# Patient Record
Sex: FEMALE | Race: WHITE | Employment: UNEMPLOYED | RURAL
[De-identification: names, ages, dates, MRNs, and addresses within clinical notes are randomized per-mention and may not be internally consistent; named-entity substitution may affect disease eponyms.]

---

## 2017-03-13 ENCOUNTER — OFFICE VISIT (OUTPATIENT)
Dept: FAMILY MEDICINE CLINIC | Age: 37
End: 2017-03-13

## 2017-03-13 VITALS
RESPIRATION RATE: 12 BRPM | OXYGEN SATURATION: 98 % | DIASTOLIC BLOOD PRESSURE: 27 MMHG | HEIGHT: 62 IN | SYSTOLIC BLOOD PRESSURE: 87 MMHG | BODY MASS INDEX: 18.95 KG/M2 | HEART RATE: 73 BPM | WEIGHT: 103 LBS | TEMPERATURE: 98.2 F

## 2017-03-13 DIAGNOSIS — G89.18 PAIN FOLLOWING ORAL SURGERY: Primary | ICD-10-CM

## 2017-03-13 RX ORDER — HYDROCODONE BITARTRATE AND ACETAMINOPHEN 5; 325 MG/1; MG/1
TABLET ORAL
COMMUNITY
End: 2017-08-11

## 2017-03-13 RX ORDER — HYDROCODONE BITARTRATE AND ACETAMINOPHEN 5; 325 MG/1; MG/1
1 TABLET ORAL
Qty: 30 TAB | Refills: 0 | Status: SHIPPED | OUTPATIENT
Start: 2017-03-13 | End: 2017-08-11

## 2017-03-13 RX ORDER — LIDOCAINE HYDROCHLORIDE 20 MG/ML
5 SOLUTION OROPHARYNGEAL AS NEEDED
Qty: 100 ML | Refills: 2 | Status: SHIPPED | OUTPATIENT
Start: 2017-03-13 | End: 2017-08-11

## 2017-03-13 NOTE — PATIENT INSTRUCTIONS

## 2017-03-13 NOTE — MR AVS SNAPSHOT
Visit Information Date & Time Provider Department Dept. Phone Encounter #  
 3/13/2017  3:20 PM Casie Ross MD [address] [place] [address] [phone] [id number] Upcoming Health Maintenance Date Due Pneumococcal 19-64 Medium Risk (1 of 1 - PPSV23) 10/13/2000 DTaP/Tdap/Td series (1 - Tdap) 10/13/2002 INFLUENZA AGE 9 TO ADULT 8/1/2016 PAP AKA CERVICAL CYTOLOGY 3/19/2017 Allergies as of 3/13/2017  Review Complete On: 3/13/2017 By: Sridevi Remedies Severity Noted Reaction Type Reactions Aspirin  07/08/2014   Systemic Hives, Unknown (comments)  
 headaches Current Immunizations  Never Reviewed No immunizations on file. Not reviewed this visit You Were Diagnosed With   
  
 Codes Comments Pain following oral surgery    -  Primary ICD-10-CM: G89.18 
ICD-9-CM: 528.9, 338.18 Vitals BP Pulse Temp Resp Height(growth percentile) Weight(growth percentile) (!) 87/27 73 98.2 °F (36.8 °C) (Oral) 12 5' 2\" (1.575 m) 103 lb (46.7 kg) LMP SpO2 BMI OB Status Smoking Status 03/04/2017 98% 18.84 kg/m2 Having regular periods Current Every Day Smoker Vitals History BMI and BSA Data Body Mass Index Body Surface Area  
 18.84 kg/m 2 1.43 m 2 Preferred Pharmacy Pharmacy Name Phone [facility] [phone] Your Updated Medication List  
  
   
This list is accurate as of: 3/13/17  3:47 PM.  Always use your most recent med list.  
  
  
  
  
 acetaminophen 500 mg tablet Commonly known as:  TYLENOL Take 2 Tabs by mouth three (3) times daily as needed for Pain. Alternate with motrin. cyclobenzaprine 10 mg tablet Commonly known as:  FLEXERIL Take 1 Tab by mouth three (3) times daily as needed. * HYDROcodone-acetaminophen 5-325 mg per tablet Commonly known as:  Kamala Christopher Take  by mouth. * HYDROcodone-acetaminophen 5-325 mg per tablet Commonly known as:  Bibiana Arts Take 1 Tab by mouth every eight (8) hours as needed for Pain. Max Daily Amount: 3 Tabs.  
  
 lidocaine 2 % solution Commonly known as:  XYLOCAINE Take 5 mL by mouth as needed. For oral pain. * Notice: This list has 2 medication(s) that are the same as other medications prescribed for you. Read the directions carefully, and ask your doctor or other care provider to review them with you. Prescriptions Printed Refills  
 lidocaine (XYLOCAINE) 2 % solution 2 Sig: Take 5 mL by mouth as needed. For oral pain. Class: Print Route: Oral  
 HYDROcodone-acetaminophen (NORCO) 5-325 mg per tablet 0 Sig: Take 1 Tab by mouth every eight (8) hours as needed for Pain. Max Daily Amount: 3 Tabs. Class: Print Route: Oral  
  
Patient Instructions A Healthy Lifestyle: Care Instructions Your Care Instructions A healthy lifestyle can help you feel good, stay at a healthy weight, and have plenty of energy for both work and play. A healthy lifestyle is something you can share with your whole family. A healthy lifestyle also can lower your risk for serious health problems, such as high blood pressure, heart disease, and diabetes. You can follow a few steps listed below to improve your health and the health of your family. Follow-up care is a key part of your treatment and safety. Be sure to make and go to all appointments, and call your doctor if you are having problems. Its also a good idea to know your test results and keep a list of the medicines you take. How can you care for yourself at home? · Do not eat too much sugar, fat, or fast foods. You can still have dessert and treats now and then. The goal is moderation. · Start small to improve your eating habits. Pay attention to portion sizes, drink less juice and soda pop, and eat more fruits and vegetables. ¨ Eat a healthy amount of food. A 3-ounce serving of meat, for example, is about the size of a deck of cards. Fill the rest of your plate with vegetables and whole grains. ¨ Limit the amount of soda and sports drinks you have every day. Drink more water when you are thirsty. ¨ Eat at least 5 servings of fruits and vegetables every day. It may seem like a lot, but it is not hard to reach this goal. A serving or helping is 1 piece of fruit, 1 cup of vegetables, or 2 cups of leafy, raw vegetables. Have an apple or some carrot sticks as an afternoon snack instead of a candy bar. Try to have fruits and/or vegetables at every meal. 
· Make exercise part of your daily routine. You may want to start with simple activities, such as walking, bicycling, or slow swimming. Try to be active 30 to 60 minutes every day. You do not need to do all 30 to 60 minutes all at once. For example, you can exercise 3 times a day for 10 or 20 minutes. Moderate exercise is safe for most people, but it is always a good idea to talk to your doctor before starting an exercise program. 
· Keep moving. [place] Feeling the lawn, work in the garden, or [employer]. Take the stairs instead of the elevator at work. · If you smoke, quit. People who smoke have an increased risk for heart attack, stroke, cancer, and other lung illnesses. Quitting is hard, but there are ways to boost your chance of quitting tobacco for good. ¨ Use nicotine gum, patches, or lozenges. ¨ Ask your doctor about stop-smoking programs and medicines. ¨ Keep trying. In addition to reducing your risk of diseases in the future, you will notice some benefits soon after you stop using tobacco. If you have shortness of breath or asthma symptoms, they will likely get better within a few weeks after you quit. · Limit how much alcohol you drink. Moderate amounts of alcohol (up to 2 drinks a day for men, 1 drink a day for women) are okay.  But drinking too much can lead to liver problems, high blood pressure, and other health problems. Family health If you have a family, there are many things you can do together to improve your health. · Eat meals together as a family as often as possible. · Eat healthy foods. This includes fruits, vegetables, lean meats and dairy, and whole grains. · Include your family in your fitness plan. Most people think of activities such as jogging or tennis as the way to fitness, but there are many ways you and your family can be more active. Anything that makes you breathe hard and gets your heart pumping is exercise. Here are some tips: 
¨ Walk to do errands or to take your child to school or the bus. ¨ Go for a family bike ride after dinner instead of watching TV. Where can you learn more? Go to http://stanley-bertin.info/. Enter K364 in the search box to learn more about \"A Healthy Lifestyle: Care Instructions. \" Current as of: July 26, 2016 Content Version: 11.1 © 5404-3420 [employer]. Care instructions adapted under license by [employer] (which disclaims liability or warranty for this information). If you have questions about a medical condition or this instruction, always ask your healthcare professional. [place] [address] any warranty or liability for your use of this information. Introducing [address]! Delfina Campbell introduces [employer] patient portal. Now you can access parts of your medical record, email your doctor's office, and request medication refills online. 1. In your internet browser, go to [email or web address] 2. Click on the First Time User? Click Here link in the Sign In box. You will see the New Member Sign Up page. 3. Enter your [employer] Access Code exactly as it appears below. You will not need to use this code after youve completed the sign-up process.  If you do not sign up before the expiration date, you must request a new code. · [employer] Access Code: [email or web address] Expires: 6/11/2017  3:02 PM 
 
4. Enter the last four digits of your Social Security Number (xxxx) and Date of Birth (mm/dd/yyyy) as indicated and click Submit. You will be taken to the next sign-up page. 5. Create a [employer] ID. This will be your [employer] login ID and cannot be changed, so think of one that is secure and easy to remember. 6. Create a [employer] password. You can change your password at any time. 7. Enter your Password Reset Question and Answer. This can be used at a later time if you forget your password. 8. Enter your e-mail address. You will receive e-mail notification when new information is available in [address] 9. Click Sign Up. You can now view and download portions of your medical record. 10. Click the Download Summary menu link to download a portable copy of your medical information. If you have questions, please visit the Frequently Asked Questions section of the [employer] website. Remember, [employer] is NOT to be used for urgent needs. For medical emergencies, dial 911. Now available from your iPhone and Android! Please provide this summary of care documentation to your next provider. Your primary care clinician is listed as Thomas Archuleta. If you have any questions after today's visit, please call [phone].

## 2017-03-13 NOTE — PROGRESS NOTES
Reviewed record in preparation for visit and have necessary documentation      Body mass index is 18.84 kg/(m^2).     Health Maintenance Due   Topic Date Due    Pneumococcal 19-64 Medium Risk (1 of 1 - PPSV23) 10/13/2000    DTaP/Tdap/Td series (1 - Tdap) 10/13/2002    INFLUENZA AGE 9 TO ADULT  08/01/2016    PAP AKA CERVICAL CYTOLOGY  03/19/2017

## 2017-03-14 ENCOUNTER — TELEPHONE (OUTPATIENT)
Dept: FAMILY MEDICINE CLINIC | Age: 37
End: 2017-03-14

## 2017-03-14 NOTE — TELEPHONE ENCOUNTER
Please call the patient in reference to her appiontment yesterday. She can be reached at [phone].

## 2017-03-14 NOTE — TELEPHONE ENCOUNTER
Patient called back. Informed her of the below. She states that she can't decrease the frequency of her pain medication because she just had some teeth removed. She stated that she would just deal with the dizziness.

## 2017-03-14 NOTE — TELEPHONE ENCOUNTER
Elo Pugh from [facility] called. They need to know how many times a day the patient is to use the Lidocaine. Please advise at [phone].

## 2017-03-20 NOTE — PROGRESS NOTES
Patient: Melanie To MRN: [id number]  SSN: [id number]    YOB: 1980  Age: 29 y.o. Sex: female        Subjective:     Chief Complaint   Patient presents with    Dental Pain       HPI: she is a 29y.o. year old female who presents for     Encounter Diagnoses   Name Primary?  Pain following oral surgery Yes       BP Readings from Last 3 Encounters:   03/13/17 (!) 87/27   01/20/16 97/61   11/13/15 (!) 89/64       Wt Readings from Last 3 Encounters:   03/13/17 103 lb (46.7 kg)   01/20/16 108 lb (49 kg)   11/13/15 106 lb 3.2 oz (48.2 kg)     Body mass index is 18.84 kg/(m^2). Current and past medical information:    Current Medications after this visit[de-identified]     Current Outpatient Prescriptions   Medication Sig    HYDROcodone-acetaminophen (NORCO) 5-325 mg per tablet Take  by mouth.  lidocaine (XYLOCAINE) 2 % solution Take 5 mL by mouth as needed. For oral pain.  HYDROcodone-acetaminophen (NORCO) 5-325 mg per tablet Take 1 Tab by mouth every eight (8) hours as needed for Pain. Max Daily Amount: 3 Tabs.  acetaminophen (TYLENOL) 500 mg tablet Take 2 Tabs by mouth three (3) times daily as needed for Pain. Alternate with motrin.  cyclobenzaprine (FLEXERIL) 10 mg tablet Take 1 Tab by mouth three (3) times daily as needed. No current facility-administered medications for this visit.         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    Depression with anxiety 07/11/2014    Anemia 07/11/2014    Tobacco abuse 07/11/2014       Past Medical History:   Diagnosis Date    ADD (attention deficit disorder)     Anemia     Anxiety     Binge-eating and purging type anorexia nervosa     CMT (Charcot-Anabella-Tooth disease)     Depression        Allergies   Allergen Reactions    Aspirin Hives and Unknown (comments)     headaches       Past Surgical History:   Procedure Laterality Date    BREAST SURGERY PROCEDURE UNLISTED      HX GYN         Social History     Social History    Marital status:      Spouse name: N/A    Number of children: N/A    Years of education: N/A     Social History Main Topics    Smoking status: Current Every Day Smoker    Smokeless tobacco: Never Used    Alcohol use Yes      Comment: once in a while    Drug use: No    Sexual activity: Yes     Partners: Male     Other Topics Concern    None     Social History Narrative         Objective:     Review of Systems:  Constitutional: Negative for F/C, fatigue or malaise  Derm: Negative for rash or lesion  HEENT: see HPI  Cardiovascular: Negative for dizziness, chest pain or palpitations  Respiratory: Negative for cough, wheezing or SOB  Neurological: Negative for headache, weakness or paresthesia    Vitals:    03/13/17 1522   BP: (!) 87/27   Pulse: 73   Resp: 12   Temp: 98.2 °F (36.8 °C)   TempSrc: Oral   SpO2: 98%   Weight: 103 lb (46.7 kg)   Height: 5' 2\" (1.575 m)      Body mass index is 18.84 kg/(m^2). Physical Exam:  Constitutional: thin, appearas uncomfortable  Head: normocephalic, atraumatic  Eyes: sclera clear, EOMI  Oropharynx: edentulous, gingival edema    Neck: normal range of motion  Cardiovascular:  regular rate and rhythm  Respiratory: clear with symmetrical effort  Extremities: full range of motion  Neurology: normal strength and sensation  Psych: active, alert and oriented, affect appropriate       Health Maintenance Due   Topic Date Due    Pneumococcal 19-64 Medium Risk (1 of 1 - PPSV23) 10/13/2000    DTaP/Tdap/Td series (1 - Tdap) 10/13/2002    INFLUENZA AGE 9 TO ADULT  08/01/2016    PAP AKA CERVICAL CYTOLOGY  03/19/2017       Risk, benefits and potential costs of recommended health maintenance discussed. Patient expressed understanding and declined at this time. Assessment and orders:       ICD-10-CM ICD-9-CM    1.  Pain following oral surgery G89.18 528.9      338.18          Plan of care:  Diagnoses were discussed in detail with patient. Medication risks/benefits/side effects discussed with patient. All of the patient's questions were addressed and answered to apparent satisfaction. The patient understands and agrees with our plan of care. The patient knows to call back if they have questions about the plan of care or if symptoms change. The patient received an After-Visit Summary which contains VS, diagnoses, orders, allergy and medication lists. Patient Care Team:  Lien Childers MD as PCP - [facility])    Follow-up Disposition:  Return if symptoms worsen or fail to improve. No future appointments.     Signed By: iLen Childers MD     March 19, 2017

## 2017-08-11 ENCOUNTER — OFFICE VISIT (OUTPATIENT)
Dept: FAMILY MEDICINE CLINIC | Age: 37
End: 2017-08-11

## 2017-08-11 VITALS
SYSTOLIC BLOOD PRESSURE: 92 MMHG | TEMPERATURE: 98.1 F | RESPIRATION RATE: 20 BRPM | DIASTOLIC BLOOD PRESSURE: 61 MMHG | HEIGHT: 62 IN | BODY MASS INDEX: 18.99 KG/M2 | HEART RATE: 74 BPM | WEIGHT: 103.2 LBS | OXYGEN SATURATION: 96 %

## 2017-08-11 DIAGNOSIS — R51.9 NONINTRACTABLE HEADACHE, UNSPECIFIED CHRONICITY PATTERN, UNSPECIFIED HEADACHE TYPE: ICD-10-CM

## 2017-08-11 DIAGNOSIS — Z72.0 TOBACCO ABUSE: ICD-10-CM

## 2017-08-11 DIAGNOSIS — F32.A DEPRESSION, UNSPECIFIED DEPRESSION TYPE: ICD-10-CM

## 2017-08-11 DIAGNOSIS — J06.0 ACUTE LARYNGOPHARYNGITIS: Primary | ICD-10-CM

## 2017-08-11 LAB
S PYO AG THROAT QL: POSITIVE
VALID INTERNAL CONTROL?: YES

## 2017-08-11 RX ORDER — FLUOXETINE 10 MG/1
10 CAPSULE ORAL DAILY
Qty: 30 CAP | Refills: 5 | Status: SHIPPED | OUTPATIENT
Start: 2017-08-11 | End: 2018-08-20

## 2017-08-11 RX ORDER — DICLOFENAC SODIUM 75 MG/1
75 TABLET, DELAYED RELEASE ORAL
Qty: 60 TAB | Refills: 0 | Status: SHIPPED | OUTPATIENT
Start: 2017-08-11 | End: 2017-10-25

## 2017-08-11 RX ORDER — ALPRAZOLAM 0.5 MG/1
TABLET ORAL
COMMUNITY
End: 2018-08-20 | Stop reason: SDUPTHER

## 2017-08-11 RX ORDER — AMOXICILLIN 500 MG/1
500 CAPSULE ORAL 3 TIMES DAILY
Qty: 30 CAP | Refills: 0 | Status: SHIPPED | OUTPATIENT
Start: 2017-08-11 | End: 2017-08-21

## 2017-08-11 NOTE — PROGRESS NOTES
Health Maintenance Due   Topic Date Due    Pneumococcal 19-64 Medium Risk (1 of 1 - PPSV23) 10/13/2000    DTaP/Tdap/Td series (1 - Tdap) 10/13/2002    PAP AKA CERVICAL CYTOLOGY  03/19/2017    INFLUENZA AGE 9 TO ADULT  08/01/2017

## 2017-08-11 NOTE — MR AVS SNAPSHOT
Visit Information Date & Time Provider Department Dept. Phone Encounter #  
 8/11/2017 10:20 AM Trista Graham MD [facility] [id number] Upcoming Health Maintenance Date Due Pneumococcal 19-64 Medium Risk (1 of 1 - PPSV23) 10/13/2000 DTaP/Tdap/Td series (1 - Tdap) 10/13/2002 PAP AKA CERVICAL CYTOLOGY 3/19/2017 INFLUENZA AGE 9 TO ADULT 8/1/2017 Allergies as of 8/11/2017  Review Complete On: 8/11/2017 By: Trista Graham MD  
  
 Severity Noted Reaction Type Reactions Aspirin  07/08/2014   Systemic Hives, Unknown (comments)  
 headaches Current Immunizations  Never Reviewed No immunizations on file. Not reviewed this visit You Were Diagnosed With   
  
 Codes Comments Acute laryngopharyngitis    -  Primary ICD-10-CM: J06.0 ICD-9-CM: 465.0 Depression, unspecified depression type     ICD-10-CM: F32.9 ICD-9-CM: 429 Nonintractable headache, unspecified chronicity pattern, unspecified headache type     ICD-10-CM: R51 ICD-9-CM: 784.0 Tobacco abuse     ICD-10-CM: Z72.0 ICD-9-CM: 305.1 Vitals BP Pulse Temp Resp Height(growth percentile) Weight(growth percentile) 92/61 74 98.1 °F (36.7 °C) (Oral) 20 5' 2\" (1.575 m) 103 lb 3.2 oz (46.8 kg) LMP SpO2 BMI OB Status Smoking Status 07/30/2017 96% 18.88 kg/m2 Having regular periods Current Every Day Smoker Vitals History BMI and BSA Data Body Mass Index Body Surface Area  
 18.88 kg/m 2 1.43 m 2 Preferred Pharmacy Pharmacy Name Phone [address] [facility] [place] Your Updated Medication List  
  
   
This list is accurate as of: 8/11/17 11:18 AM.  Always use your most recent med list.  
  
  
  
  
 acetaminophen 500 mg tablet Commonly known as:  TYLENOL Take 2 Tabs by mouth three (3) times daily as needed for Pain. Alternate with motrin. amoxicillin 500 mg capsule Commonly known as:  AMOXIL Take 1 Cap by mouth three (3) times daily for 10 days. cyclobenzaprine 10 mg tablet Commonly known as:  FLEXERIL Take 1 Tab by mouth three (3) times daily as needed. diclofenac EC 75 mg EC tablet Commonly known as:  VOLTAREN Take 1 Tab by mouth two (2) times daily as needed. For Headache. FLUoxetine 10 mg capsule Commonly known as:  PROzac Take 1 Cap by mouth daily. XANAX 0.5 mg tablet Generic drug:  ALPRAZolam  
Take  by mouth. Prescriptions Sent to Pharmacy Refills FLUoxetine (PROZAC) 10 mg capsule 5 Sig: Take 1 Cap by mouth daily. Class: Normal  
 Pharmacy: [facility] [address] Ph #: [phone] Route: Oral  
 diclofenac EC (VOLTAREN) 75 mg EC tablet 0 Sig: Take 1 Tab by mouth two (2) times daily as needed. For Headache. Class: Normal  
 [facility] [address] [facility] #: [phone] Route: Oral  
 amoxicillin (AMOXIL) 500 mg capsule 0 Sig: Take 1 Cap by mouth three (3) times daily for 10 days. Class: Normal  
 [facility] [address] Ph #: [phone] Route: Oral  
  
We Performed the Following AMB POC RAPID STREP A [93099 CPT(R)] Patient Instructions Recovering From Depression: Care Instructions Your Care Instructions Taking good care of yourself is important as you recover from depression. In time, your symptoms will fade as your treatment takes hold. Do not give up. Instead, focus your energy on getting better. Your mood will improve. It just takes some time. Focus on things that can help you feel better, such as being with friends and family, eating well, and getting enough rest. But take things slowly. Do not do too much too soon. You will begin to feel better gradually. Follow-up care is a key part of your treatment and safety. Be sure to make and go to all appointments, and call your doctor if you are having problems. It's also a good idea to know your test results and keep a list of the medicines you take. How can you care for yourself at home? Be realistic · If you have a large task to do, break it up into smaller steps you can handle, and just do what you can. · You may want to put off important decisions until your depression has lifted. If you have plans that will have a major impact on your life, such as marriage, divorce, or a job change, try to wait a bit. Talk it over with friends and loved ones who can help you look at the overall picture first. 
· Reaching out to people for help is important. Do not isolate yourself. Let your family and friends help you. Find someone you can trust and confide in, and talk to that person. · Be patient, and be kind to yourself. Remember that depression is not your fault and is not something you can overcome with willpower alone. Treatment is necessary for depression, just like for any other illness. Feeling better takes time, and your mood will improve little by little. Stay active · Stay busy and get outside. Take a walk, or try some other light exercise. · Talk with your doctor about an exercise program. Exercise can help with mild depression. · Go to a movie or concert. Take part in a Lutheran activity or other social gathering. Go to a ball game. · Ask a friend to have dinner with you. Take care of yourself · Eat a balanced diet with plenty of fresh fruits and vegetables, whole grains, and lean protein. If you have lost your appetite, eat small snacks rather than large meals. · Avoid drinking alcohol or using illegal drugs. Do not take medicines that have not been prescribed for you. They may interfere with medicines you may be taking for depression, or they may make your depression worse. · Take your medicines exactly as they are prescribed. You may start to feel better within 1 to 3 weeks of taking antidepressant medicine. But it can take as many as 6 to 8 weeks to see more improvement. If you have questions or concerns about your medicines, or if you do not notice any improvement by 3 weeks, talk to your doctor. · If you have any side effects from your medicine, tell your doctor. Antidepressants can make you feel tired, dizzy, or nervous. Some people have dry mouth, constipation, headaches, sexual problems, or diarrhea. Many of these side effects are mild and will go away on their own after you have been taking the medicine for a few weeks. Some may last longer. Talk to your doctor if side effects are bothering you too much. You might be able to try a different medicine. · Get enough sleep. If you have problems sleeping: ¨ Go to bed at the same time every night, and get up at the same time every morning. ¨ Keep your bedroom dark and quiet. ¨ Do not exercise after 5:00 p.m. ¨ Avoid drinks with caffeine after 5:00 p.m. · Avoid sleeping pills unless they are prescribed by the doctor treating your depression. Sleeping pills may make you groggy during the day, and they may interact with other medicine you are taking. · If you have any other illnesses, such as diabetes, heart disease, or high blood pressure, make sure to continue with your treatment. Tell your doctor about all of the medicines you take, including those with or without a prescription. · Keep the numbers for these national suicide hotlines: 2-904-590-TALK (0-606.857.6116) and 6-232-KYJPAXH (3-556.945.7720). If you or someone you know talks about suicide or feeling hopeless, get help right away. When should you call for help? Call 911 anytime you think you may need emergency care. For example, call if: 
· You feel like hurting yourself or someone else.  
· Someone you know has depression and is about to attempt or is attempting suicide. Call your doctor now or seek immediate medical care if: 
· You hear voices. · Someone you know has depression and: 
¨ Starts to give away his or her possessions. ¨ Uses illegal drugs or drinks alcohol heavily. ¨ Talks or writes about death, including writing suicide notes or talking about guns, knives, or pills. ¨ Starts to spend a lot of time alone. ¨ Acts very aggressively or suddenly appears calm. Watch closely for changes in your health, and be sure to contact your doctor if: 
· You do not get better as expected. Where can you learn more? Go to http://stanley-bertin.info/. Enter J701 in the search box to learn more about \"Recovering From Depression: Care Instructions. \" Current as of: July 26, 2016 Content Version: 11.3 © 1704-6838 itravel. Care instructions adapted under license by [employer] (which disclaims liability or warranty for this information). If you have questions about a medical condition or this instruction, always ask your healthcare professional. [address] any warranty or liability for your use of this information. Introducing [facility]! Osmin Navarro introduces InGaugeIt patient portal. Now you can access parts of your medical record, email your doctor's office, and request medication refills online. 1. In your internet browser, go to [email or web address] 2. Click on the First Time User? Click Here link in the Sign In box. You will see the New Member Sign Up page. 3. Enter your InGaugeIt Access Code exactly as it appears below. You will not need to use this code after youve completed the sign-up process. If you do not sign up before the expiration date, you must request a new code. · InGaugeIt Access Code: [email or web address] Expires: 11/9/2017 11:18 AM 
 
4.  Enter the last four digits of your Social Security Number (xxxx) and Date of Birth (mm/dd/yyyy) as indicated and click Submit. You will be taken to the next sign-up page. 5. Create a Zaggora ID. This will be your Zaggora login ID and cannot be changed, so think of one that is secure and easy to remember. 6. Create a Zaggora password. You can change your password at any time. 7. Enter your Password Reset Question and Answer. This can be used at a later time if you forget your password. 8. Enter your e-mail address. You will receive e-mail notification when new information is available in [address] 9. Click Sign Up. You can now view and download portions of your medical record. 10. Click the Download Summary menu link to download a portable copy of your medical information. If you have questions, please visit the Frequently Asked Questions section of the Zaggora website. Remember, Zaggora is NOT to be used for urgent needs. For medical emergencies, dial 911. Now available from your iPhone and Android! Please provide this summary of care documentation to your next provider. Your primary care clinician is listed as Blanca Pruett. If you have any questions after today's visit, please call [phone].

## 2017-08-11 NOTE — PATIENT INSTRUCTIONS
Recovering From Depression: Care Instructions  Your Care Instructions  Taking good care of yourself is important as you recover from depression. In time, your symptoms will fade as your treatment takes hold. Do not give up. Instead, focus your energy on getting better. Your mood will improve. It just takes some time. Focus on things that can help you feel better, such as being with friends and family, eating well, and getting enough rest. But take things slowly. Do not do too much too soon. You will begin to feel better gradually. Follow-up care is a key part of your treatment and safety. Be sure to make and go to all appointments, and call your doctor if you are having problems. It's also a good idea to know your test results and keep a list of the medicines you take. How can you care for yourself at home? Be realistic  · If you have a large task to do, break it up into smaller steps you can handle, and just do what you can. · You may want to put off important decisions until your depression has lifted. If you have plans that will have a major impact on your life, such as marriage, divorce, or a job change, try to wait a bit. Talk it over with friends and loved ones who can help you look at the overall picture first.  · Reaching out to people for help is important. Do not isolate yourself. Let your family and friends help you. Find someone you can trust and confide in, and talk to that person. · Be patient, and be kind to yourself. Remember that depression is not your fault and is not something you can overcome with willpower alone. Treatment is necessary for depression, just like for any other illness. Feeling better takes time, and your mood will improve little by little. Stay active  · Stay busy and get outside. Take a walk, or try some other light exercise. · Talk with your doctor about an exercise program. Exercise can help with mild depression. · Go to a movie or concert.  Take part in a Latter day activity or other social gathering. Go to a [employer] game. · Ask a friend to have dinner with you. Take care of yourself  · Eat a balanced diet with plenty of fresh fruits and vegetables, whole grains, and lean protein. If you have lost your appetite, eat small snacks rather than large meals. · Avoid drinking alcohol or using illegal drugs. Do not take medicines that have not been prescribed for you. They may interfere with medicines you may be taking for depression, or they may make your depression worse. · Take your medicines exactly as they are prescribed. You may start to feel better within 1 to 3 weeks of taking antidepressant medicine. But it can take as many as 6 to 8 weeks to see more improvement. If you have questions or concerns about your medicines, or if you do not notice any improvement by 3 weeks, talk to your doctor. · If you have any side effects from your medicine, tell your doctor. Antidepressants can make you feel tired, dizzy, or nervous. Some people have dry mouth, constipation, headaches, sexual problems, or diarrhea. Many of these side effects are mild and will go away on their own after you have been taking the medicine for a few weeks. Some may last longer. Talk to your doctor if side effects are bothering you too much. You might be able to try a different medicine. · Get enough sleep. If you have problems sleeping:  ¨ Go to bed at the same time every night, and get up at the same time every morning. ¨ Keep your bedroom dark and quiet. ¨ Do not exercise after 5:00 p.m. ¨ Avoid drinks with caffeine after 5:00 p.m. · Avoid sleeping pills unless they are prescribed by the doctor treating your depression. Sleeping pills may make you groggy during the day, and they may interact with other medicine you are taking. · If you have any other illnesses, such as diabetes, heart disease, or high blood pressure, make sure to continue with your treatment.  Tell your doctor about all of the medicines you take, including those with or without a prescription. · Keep the numbers for these national suicide hotlines: 1-973-291-TALK (3-954.388.8652) and 6-241-GJHLPXT (5-356.870.3763). If you or someone you know talks about suicide or feeling hopeless, get help right away. When should you call for help? Call 911 anytime you think you may need emergency care. For example, call if:  · You feel like hurting yourself or someone else. · Someone you know has depression and is about to attempt or is attempting suicide. Call your doctor now or seek immediate medical care if:  · You hear voices. · Someone you know has depression and:  ¨ Starts to give away his or her possessions. ¨ Uses illegal drugs or drinks alcohol heavily. ¨ Talks or writes about death, including writing suicide notes or talking about guns, knives, or pills. ¨ Starts to spend a lot of time alone. ¨ Acts very aggressively or suddenly appears calm. Watch closely for changes in your health, and be sure to contact your doctor if:  · You do not get better as expected. Where can you learn more? Go to http://stanley-bertin.info/. Enter O159 in the search box to learn more about \"Recovering From Depression: Care Instructions. \"  Current as of: July 26, 2016  Content Version: 11.3  © 8305-8751 [employer]. Care instructions adapted under license by [employer] (which disclaims liability or warranty for this information). If you have questions about a medical condition or this instruction, always ask your healthcare professional. [place] [address] any warranty or liability for your use of this information.

## 2017-08-17 NOTE — PROGRESS NOTES
Patient: Maxwell Dugan MRN: [id number]  SSN: [id number]    YOB: 1980  Age: 29 y.o. Sex: female      Chief Complaint   Patient presents with    Sore Throat    Medication Evaluation     Maxwell Dugan is a 29 y.o. female presents with complaints of sore throat and headache for several days. There has been no nausea and no vomiting . she has not had  swollen glands, myalgias and fever. Symptoms are moderate. Patient is drinking plenty of fluids. There is not a hx of asthma. There is a hx of allergic rhinitis. There is current tobacco abuse. There have not been contacts with similar infections. Patient with hx of depression and anxiety. Patient sans health insurance. She needs refills of medication. Denies wanting to harm self or others. BP Readings from Last 3 Encounters:   08/11/17 92/61   03/13/17 (!) 87/27   01/20/16 97/61     Wt Readings from Last 3 Encounters:   08/11/17 103 lb 3.2 oz (46.8 kg)   03/13/17 103 lb (46.7 kg)   01/20/16 108 lb (49 kg)     Body mass index is 18.88 kg/(m^2). Medications:     Current Outpatient Prescriptions   Medication Sig    ALPRAZolam (XANAX) 0.5 mg tablet Take  by mouth.  FLUoxetine (PROZAC) 10 mg capsule Take 1 Cap by mouth daily.  diclofenac EC (VOLTAREN) 75 mg EC tablet Take 1 Tab by mouth two (2) times daily as needed. For Headache.  amoxicillin (AMOXIL) 500 mg capsule Take 1 Cap by mouth three (3) times daily for 10 days.  acetaminophen (TYLENOL) 500 mg tablet Take 2 Tabs by mouth three (3) times daily as needed for Pain. Alternate with motrin.  cyclobenzaprine (FLEXERIL) 10 mg tablet Take 1 Tab by mouth three (3) times daily as needed. No current facility-administered medications for this visit.         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    Depression with anxiety 07/11/2014    Anemia 07/11/2014    Tobacco abuse 07/11/2014       Medical History:     Past Medical History:   Diagnosis Date    ADD (attention deficit disorder)     Anemia     Anxiety     Binge-eating and purging type anorexia nervosa     CMT (Charcot-Anabella-Tooth disease)     Depression        Allergies:      Allergies   Allergen Reactions    Aspirin Hives and Unknown (comments)     headaches       Surgical History:     Past Surgical History:   Procedure Laterality Date    BREAST SURGERY PROCEDURE UNLISTED      HX GYN         Social History:     Social History     Social History    Marital status:      Spouse name: N/A    Number of children: N/A    Years of education: N/A     Social History Main Topics    Smoking status: Current Every Day Smoker    Smokeless tobacco: Never Used    Alcohol use Yes      Comment: once in a while    Drug use: No    Sexual activity: Yes     Partners: Male     Other Topics Concern    None     Social History Narrative       Review of Symptoms:  Constitutional: c/o malaise, denies fever or chills  Skin: Negative for rash or lesion  Head: Negative for facial swelling or tenderness  Eyes: Negative for redness or discharge  Ears: Negative for otalgia or decreased hearing  Nose: c/o nasal congestion, denies sinus pressure  Neck: c/o sore throat, denies lymphadenopathy   Cardiovascular: Negative for chest pain or palpitations  Respiratory: c/o non-productive cough, denies wheezing or SOB  Gastrointestinal: Negative for nausea or abdominal pain  Neurologic: Negative for headache or dizziness      Visit Vitals    BP 92/61    Pulse 74    Temp 98.1 °F (36.7 °C) (Oral)    Resp 20    Ht 5' 2\" (1.575 m)    Wt 103 lb 3.2 oz (46.8 kg)    SpO2 96%    BMI 18.88 kg/m2       Physical Examination:  General: Thin, in no acute distress  Skin: Warm and dry sans rash or lesion  Head: Normocephalic, atraumatic  Eyes: Sclera clear, EOMI, PERRL  Ears: tympanic membranes normal in appearance  Nose: mucosal edema with rhinorrhea  Oropharynx: posterior erythema, no exudate   Neck: Normal range of motion, no lymphadenopathy  Cardiovascular: normal S1, S2, regular rate and rhythm  Respiratory: Clear to auscultation bilaterally with symmetrical, unlabored effort  Abdomen: Soft, Normal BS  Extremities: Full range of motion  Neurologic: Active, alert and oriented      Diagnoses and all orders for this visit:    1. Acute laryngopharyngitis  -     AMB POC RAPID STREP A  -     amoxicillin (AMOXIL) 500 mg capsule; Take 1 Cap by mouth three (3) times daily for 10 days. 2. Depression, unspecified depression type  -     FLUoxetine (PROZAC) 10 mg capsule; Take 1 Cap by mouth daily. 3. Nonintractable headache, unspecified chronicity pattern, unspecified headache type  -     diclofenac EC (VOLTAREN) 75 mg EC tablet; Take 1 Tab by mouth two (2) times daily as needed. For Headache. 4. Tobacco abuse        Symptomatic therapy suggested: rest, increase fluids, gargle prn for sore throat and call prn if symptoms persist or worsen. Strongly advised patient to stop all use of tobacco products. I have discussed the diagnosis with the patient and the intended plan as seen in the above orders. The patient expresses understanding and agreement with our plan of care. All of the patient's questions were answered to apparent satisfaction. The patient has received an after-visit summary. The patient knows to call our office if there are any questions or concerns regarding diagnosis and treatment plans. I have discussed medication side effects and warnings with the patient as well. Follow-up Disposition:  Return in about 3 months (around 11/11/2017), or if symptoms worsen or fail to improve.

## 2017-10-25 ENCOUNTER — OFFICE VISIT (OUTPATIENT)
Dept: FAMILY MEDICINE CLINIC | Age: 37
End: 2017-10-25

## 2017-10-25 VITALS
WEIGHT: 101 LBS | BODY MASS INDEX: 18.58 KG/M2 | OXYGEN SATURATION: 99 % | HEART RATE: 87 BPM | DIASTOLIC BLOOD PRESSURE: 54 MMHG | TEMPERATURE: 99.2 F | SYSTOLIC BLOOD PRESSURE: 87 MMHG | HEIGHT: 62 IN | RESPIRATION RATE: 18 BRPM

## 2017-10-25 DIAGNOSIS — M54.2 CERVICALGIA: Primary | ICD-10-CM

## 2017-10-25 DIAGNOSIS — R51.9 NONINTRACTABLE EPISODIC HEADACHE, UNSPECIFIED HEADACHE TYPE: ICD-10-CM
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
## 2017-10-25 DIAGNOSIS — Z23 ENCOUNTER FOR IMMUNIZATION: ICD-10-CM

## 2017-10-25 RX ORDER — IBUPROFEN 800 MG/1
800 TABLET ORAL
Qty: 30 TAB | Refills: 1 | Status: SHIPPED | OUTPATIENT
Start: 2017-10-25 | End: 2018-08-20 | Stop reason: SDUPTHER

## 2017-10-25 RX ORDER — CYCLOBENZAPRINE HCL 10 MG
10 TABLET ORAL
Qty: 30 TAB | Refills: 1 | Status: SHIPPED | OUTPATIENT
Start: 2017-10-25 | End: 2018-08-20 | Stop reason: SDUPTHER
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
## 2017-10-25 NOTE — PROGRESS NOTES
Reviewed record in preparation for visit and have necessary documentation  Pt did not bring medication to office visit for review    Goals that were addressed and/or need to be completed during or after this appointment include   Health Maintenance Due   Topic Date Due    Pneumococcal 19-64 Medium Risk (1 of 1 - PPSV23) 10/13/2000    DTaP/Tdap/Td series (1 - Tdap) 10/13/2002    PAP AKA CERVICAL CYTOLOGY  03/19/2017    INFLUENZA AGE 9 TO ADULT  08/01/2017

## 2017-10-25 NOTE — PATIENT INSTRUCTIONS
Influenza Virus Vaccine (By injection)   Influenza Virus Vaccine (in-floo-EN-za VYE-leon VAX-een)  Helps prevent infection with influenza (flu) virus. Brand Name(s): Afluria 6966-4478 Formula, Shyrl Preeti 6489-8465 Formula, Afluria Quadrivalent 3157-9491 Formula, Afluria Quadrivalent 2095-0306 Formula, FluLaval Quadrivalent 8624-0600 Formula, FluLaval Quadrivalent 8111-0595 Formula, Fluad 7834-3703 Formula, Fluad 3763-7242 Formula, Fluarix Quadrivalent 0863-7403 Formula, Fluarix Quadrivalent 9424-1185 Formula, Flublok 0864-9264 Formula, Flublok 6922-6338 Formula, Flublok Quadrivalent 5114-4593 Formula, Flucelvax Quadrivalent 7545-7325 Formula, Flucelvax Quadrivalent 3338-8077 Formula   There may be other brand names for this medicine. When This Medicine Should Not Be Used: This vaccine is not right for everyone. You should not receive it if you had an allergic reaction to flu vaccine. If you are allergic to eggs, tell the caregiver who is going to give you the injection. Some brands of this vaccine contain egg proteins and could cause an allergic reaction. How to Use This Medicine:   Injectable  · The vaccine is given as a shot into a muscle or into your skin, usually in the shoulder area. The shot could be given in the thigh for babies and young children. · A nurse or other health provider will give you this medicine. · Read and follow the patient instructions that come with this medicine. Talk to your doctor or pharmacist if you have any questions. · A child who is younger than 5years old and who has not had a flu shot before may need 2 shots. The second shot should be given about 1 month after the first.  · Missed dose: Most people need only 1 dose of the vaccine. If your child needs a second dose, it is important for the vaccine to be given on schedule. If you must cancel an appointment, make a new one right away.   Drugs and Foods to Avoid:   Ask your doctor or pharmacist before using any other medicine, including over-the-counter medicines, vitamins, and herbal products. · Tell your doctor if you are using a medicine or treatment that weakens your immune system, such as a steroid, radiation, or cancer treatment. This vaccine may not work as well if you are also using these medicines. However, your doctor may still want you to get the vaccine because it can give you some protection. Warnings While Using This Medicine:   · Tell your doctor if you are pregnant or breastfeeding or if you have a weak immune system. · Tell your doctor if you ever had an unusual reaction to a flu shot, such as Guillain-Barré syndrome, or if you are allergic to latex. · The flu vaccine may not protect everyone who receives it. This vaccine will not treat flu symptoms if you have already been infected with the virus. Possible Side Effects While Using This Medicine:   Call your doctor right away if you notice any of these side effects:  · Allergic reaction: Itching or hives, swelling in your face or hands, swelling or tingling in your mouth or throat, chest tightness, trouble breathing  · Fainting, dizziness, or lightheadedness  · Fever over 103 degrees F  · Seizures  · Severe muscle weakness  If you notice these less serious side effects, talk with your doctor:   · Headache, muscle pain, tiredness  · Irritability or crying (in a child)  · Redness, pain, swelling, soreness, or a lump where the shot was given  If you notice other side effects that you think are caused by this medicine, tell your doctor. Call your doctor for medical advice about side effects. You may report side effects to FDA at 3-976-FDA-3992  © 2017 [address] Information is for End User's use only and may not be sold, redistributed or otherwise used for commercial purposes. The above information is an  only. It is not intended as medical advice for individual conditions or treatments.  Talk to your doctor, nurse or pharmacist before following any medical regimen to see if it is safe and effective for you. Neck Pain: Care Instructions  Your Care Instructions  You can have neck pain anywhere from the bottom of your head to the top of your shoulders. It can spread to the upper back or arms. Injuries, painting a ceiling, sleeping with your neck twisted, staying in one position for too long, and many other activities can cause neck pain. Most neck pain gets better with home care. Your doctor may recommend medicine to relieve pain or relax your muscles. He or she may suggest exercise and physical therapy to increase flexibility and relieve stress. You may need to wear a special (cervical) collar to support your neck for a day or two. Follow-up care is a key part of your treatment and safety. Be sure to make and go to all appointments, and call your doctor if you are having problems. It's also a good idea to know your test results and keep a list of the medicines you take. How can you care for yourself at home? · Try using a heating pad on a low or medium setting for 15 to 20 minutes every 2 or 3 hours. Try a warm shower in place of one session with the heating pad. · You can also try an ice pack for 10 to 15 minutes every 2 to 3 hours. Put a thin cloth between the ice and your skin. · Take pain medicines exactly as directed. ¨ If the doctor gave you a prescription medicine for pain, take it as prescribed. ¨ If you are not taking a prescription pain medicine, ask your doctor if you can take an over-the-counter medicine. · If your doctor recommends a cervical collar, wear it exactly as directed. When should you call for help? Call your doctor now or seek immediate medical care if:  · You have new or worsening numbness in your arms, buttocks or legs. · You have new or worsening weakness in your arms or legs. (This could make it hard to stand up.)  · You lose control of your bladder or bowels.   Watch closely for changes in your health, and be sure to contact your doctor if:  · Your neck pain is getting worse. · You are not getting better after 1 week. · You do not get better as expected. Where can you learn more? Go to http://stanley-bertin.info/. Enter [id number] in the search box to learn more about \"Neck Pain: Care Instructions. \"  Current as of: March 21, 2017  Content Version: 11.3  © 1540-8675 [employer]. Care instructions adapted under license by [employer] (which disclaims liability or warranty for this information). If you have questions about a medical condition or this instruction, always ask your healthcare professional. [address] any warranty or liability for your use of this information.
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
## 2018-08-20 ENCOUNTER — OFFICE VISIT (OUTPATIENT)
Dept: FAMILY MEDICINE CLINIC | Age: 38
End: 2018-08-20

## 2018-08-20 VITALS
HEART RATE: 89 BPM | BODY MASS INDEX: 19.54 KG/M2 | RESPIRATION RATE: 20 BRPM | HEIGHT: 62 IN | OXYGEN SATURATION: 96 % | TEMPERATURE: 99.3 F | DIASTOLIC BLOOD PRESSURE: 55 MMHG | SYSTOLIC BLOOD PRESSURE: 96 MMHG | WEIGHT: 106.2 LBS

## 2018-08-20 DIAGNOSIS — R51.9 NONINTRACTABLE EPISODIC HEADACHE, UNSPECIFIED HEADACHE TYPE: ICD-10-CM

## 2018-08-20 DIAGNOSIS — F41.8 DEPRESSION WITH ANXIETY: ICD-10-CM

## 2018-08-20 DIAGNOSIS — M54.2 CERVICALGIA: ICD-10-CM

## 2018-08-20 DIAGNOSIS — H26.9 CATARACT OF BOTH EYES, UNSPECIFIED CATARACT TYPE: Primary | ICD-10-CM

## 2018-08-20 DIAGNOSIS — Z01.818 PRE-PROCEDURAL EXAMINATION: ICD-10-CM

## 2018-08-20 RX ORDER — DULOXETIN HYDROCHLORIDE 20 MG/1
20 CAPSULE, DELAYED RELEASE ORAL DAILY
Qty: 30 CAP | Refills: 3 | Status: SHIPPED | OUTPATIENT
Start: 2018-08-20 | End: 2019-04-01 | Stop reason: SDUPTHER

## 2018-08-20 RX ORDER — IBUPROFEN 800 MG/1
800 TABLET ORAL
Qty: 30 TAB | Refills: 1 | Status: SHIPPED | OUTPATIENT
Start: 2018-08-20 | End: 2019-08-30 | Stop reason: SDUPTHER

## 2018-08-20 RX ORDER — CYCLOBENZAPRINE HCL 10 MG
10 TABLET ORAL
Qty: 30 TAB | Refills: 1 | Status: SHIPPED | OUTPATIENT
Start: 2018-08-20 | End: 2019-08-30 | Stop reason: SDUPTHER

## 2018-08-20 RX ORDER — ALPRAZOLAM 0.5 MG/1
0.5 TABLET ORAL
Qty: 30 TAB | Refills: 0 | Status: SHIPPED | OUTPATIENT
Start: 2018-08-20 | End: 2019-08-30

## 2018-08-20 NOTE — PROGRESS NOTES
Health Maintenance Due   Topic Date Due    Pneumococcal 19-64 Medium Risk (1 of 1 - PPSV23) 10/13/2000    DTaP/Tdap/Td series (1 - Tdap) 10/13/2002    PAP AKA CERVICAL CYTOLOGY  03/19/2017    Influenza Age 9 to Adult  08/01/2018     Body mass index is 19.42 kg/(m^2). 1. Have you been to the ER, urgent care clinic since your last visit? Hospitalized since your last visit? No    2. Have you seen or consulted any other health care providers outside of the [address] [place] [address] since your last visit? Include any pap smears or colon screening.  No  Reviewed record in preparation for visit and have necessary documentation  Pt did not bring medication to office visit for review  Information was given to pt on Advanced Directives, Living Will  Information was given on Shingles Vaccine  opportunity was given for questions  Goals that were addressed and/or need to be completed during or after this appointment include

## 2018-08-20 NOTE — PATIENT INSTRUCTIONS
Cataract Surgery: Before Your Surgery  What is cataract surgery? Cataracts are cloudy areas in the lens of your eye. Your lens is behind the colored part of your eye (iris). Its job is to focus light onto the back of your eye. In some people, cataracts prevent light from reaching the back of the eye. This can cause vision problems. Cataract surgery helps you see better. It replaces your natural lens, which has become cloudy, with a clear artificial one. There are two types of cataract surgery. Phacoemulsification (say \"qqio-em-md-qwc-zao-xla-LATA-shun\") is the most common type. The doctor makes a small cut in your eye. This cut is called an incision. The doctor uses a special ultrasound tool to break your cloudy lens apart. Sometimes a laser is used too. Then he or she removes the small pieces of the lens through the incision. In most cases, the doctor then inserts an artificial lens through the incision. Most people do not need stitches, because the incision is so small. If the doctor is not able to put in an artificial lens, you can wear a contact lens or thick glasses in place of your natural lens. Extracapsular extraction is a less common type of cataract surgery. The doctor makes a larger incision to remove the whole lens at once. After the doctor removes the lens, he or she stitches up the incision. Recovery from this type of surgery takes longer. Before either surgery, the doctor puts numbing drops in your eye. Some doctors use a shot instead. You may also get medicine to make you feel relaxed. You probably will not feel much pain. The surgery takes about 20 to 40 minutes. After surgery, you may have a bandage or shield on your eye. You will probably go home from surgery after 1 hour in the recovery room. Most people see better in 1 to 3 days. You may be able to go back to work or your normal routine in a few days.  It could take 3 to 10 weeks for your eye to completely heal. After your eye heals, you may still need to wear glasses, especially for reading. Follow-up care is a key part of your treatment and safety. Be sure to make and go to all appointments, and call your doctor if you are having problems. It's also a good idea to know your test results and keep a list of the medicines you take. What happens before surgery?   Surgery can be stressful. This information will help you understand what you can expect. And it will help you safely prepare for surgery.   Preparing for surgery    · Understand exactly what surgery is planned, along with the risks, benefits, and other options. · Tell your doctors ALL the medicines, vitamins, supplements, and herbal remedies you take. Some of these can increase the risk of bleeding or interact with anesthesia.     · If you take blood thinners, such as warfarin (Coumadin), clopidogrel (Plavix), or aspirin, be sure to talk to your doctor. He or she will tell you if you should stop taking these medicines before your surgery. Make sure that you understand exactly what your doctor wants you to do.     · Your doctor will tell you which medicines to take or stop before your surgery. You may need to stop taking certain medicines a week or more before surgery. So talk to your doctor as soon as you can.     · If you have an advance directive, let your doctor know. It may include a living will and a durable power of  for health care. Bring a copy to the hospital. If you don't have one, you may want to prepare one. It lets your doctor and loved ones know your health care wishes. Doctors advise that everyone prepare these papers before any type of surgery or procedure. What happens on the day of surgery? · Follow the instructions exactly about when to stop eating and drinking. If you don't, your surgery may be canceled.  If your doctor told you to take your medicines on the day of surgery, take them with only a sip of water.     · Take a bath or shower before you come in for your surgery. Do not apply lotions, perfumes, deodorants, or nail polish.     · Take off all jewelry and piercings. And take out contact lenses, if you wear them.    At the hospital or surgery center   · Bring a picture ID.     · The area for surgery is often marked to make sure there are no errors.     · You will be kept comfortable and safe by your anesthesia provider. The anesthesia may make you sleep. Or it may just numb the area being worked on.     · The surgery will take about 20 to 40 minutes. Going home   · Be sure you have someone to drive you home. Anesthesia and pain medicine make it unsafe for you to drive.     · You will be given more specific instructions about recovering from your surgery. They will cover things like diet, wound care, follow-up care, driving, and getting back to your normal routine.     · You may have a bandage or patch over your eye. You may also have a clear shield over your eye. This prevents you from rubbing it. When should you call your doctor? · You have questions or concerns.     · You don't understand how to prepare for your surgery.     · You become ill before the surgery (such as fever, flu, or a cold).     · You need to reschedule or have changed your mind about having the surgery. Where can you learn more? Go to http://stanley-bertin.info/. Enter K474 in the search box to learn more about \"Cataract Surgery: Before Your Surgery. \"  Current as of: December 3, 2017  Content Version: 11.7  © 0064-4459 [employer]. Care instructions adapted under license by [employer] (which disclaims liability or warranty for this information). If you have questions about a medical condition or this instruction, always ask your healthcare professional. [place] [address] any warranty or liability for your use of this information.

## 2018-08-20 NOTE — MR AVS SNAPSHOT
Maximus Person 
 
 
  [place] [address] 
[phone] Patient: Leeann Sainz MRN: [id number] :1980 Visit Information Date & Time Provider Department Dept. Phone Encounter #  
 2018  2:50 PM Danial Dominique [facility] [id number] Upcoming Health Maintenance Date Due Pneumococcal 19-64 Medium Risk (1 of 1 - PPSV23) 10/13/2000 DTaP/Tdap/Td series (1 - Tdap) 10/13/2002 PAP AKA CERVICAL CYTOLOGY 3/19/2017 Influenza Age 5 to Adult 2018 Allergies as of 2018  Review Complete On: 2018 By: Danial Dominique MD  
  
 Severity Noted Reaction Type Reactions Aspirin  2014   Systemic Hives, Unknown (comments)  
 headaches Current Immunizations  Never Reviewed Name Date Influenza Vaccine (Quad) PF 10/25/2017 Not reviewed this visit You Were Diagnosed With   
  
 Codes Comments Cataract of both eyes, unspecified cataract type    -  Primary ICD-10-CM: H26.9 ICD-9-CM: 366.9 Pre-procedural examination     ICD-10-CM: T35.849 ICD-9-CM: V72.84 Depression with anxiety     ICD-10-CM: F41.8 ICD-9-CM: 300.4 Cervicalgia     ICD-10-CM: M54.2 ICD-9-CM: 723.1 Nonintractable episodic headache, unspecified headache type     ICD-10-CM: R51 ICD-9-CM: 198. 0 Vitals BP Pulse Temp Resp Height(growth percentile) Weight(growth percentile) 96/55 89 99.3 °F (37.4 °C) (Oral) 20 5' 2\" (1.575 m) 106 lb 3.2 oz (48.2 kg) LMP SpO2 BMI OB Status Smoking Status 2018 96% 19.42 kg/m2 Having regular periods Current Every Day Smoker Vitals History BMI and BSA Data Body Mass Index Body Surface Area  
 19.42 kg/m 2 1.45 m 2 Preferred Pharmacy Pharmacy Name Phone [address] [facility], [address] [place] [address] [phone] Your Updated Medication List  
  
   
 This list is accurate as of 8/20/18  3:14 PM.  Always use your most recent med list.  
  
  
  
  
 acetaminophen 500 mg tablet Commonly known as:  TYLENOL Take 2 Tabs by mouth three (3) times daily as needed for Pain. Alternate with motrin. ALPRAZolam 0.5 mg tablet Commonly known as:  Lo Bogdan Take 1 Tab by mouth three (3) times daily as needed for Anxiety. Max Daily Amount: 1.5 mg.  
  
 cyclobenzaprine 10 mg tablet Commonly known as:  FLEXERIL Take 1 Tab by mouth three (3) times daily as needed. DULoxetine 20 mg capsule Commonly known as:  CYMBALTA Take 1 Cap by mouth daily. ibuprofen 800 mg tablet Commonly known as:  MOTRIN Take 1 Tab by mouth every eight (8) hours as needed. Indications: Headache Disorder Prescriptions Printed Refills ALPRAZolam (XANAX) 0.5 mg tablet 0 Sig: Take 1 Tab by mouth three (3) times daily as needed for Anxiety. Max Daily Amount: 1.5 mg.  
 Class: Print Route: Oral  
  
Prescriptions Sent to Pharmacy Refills DULoxetine (CYMBALTA) 20 mg capsule 3 Sig: Take 1 Cap by mouth daily. Class: Normal  
 Pharmacy: [address] [place] [address] Ph #: [phone] Route: Oral  
 cyclobenzaprine (FLEXERIL) 10 mg tablet 1 Sig: Take 1 Tab by mouth three (3) times daily as needed. Class: Normal  
 Pharmacy: [address] [place], [place] [address] Ph #: [phone] Route: Oral  
 ibuprofen (MOTRIN) 800 mg tablet 1 Sig: Take 1 Tab by mouth every eight (8) hours as needed. Indications: Headache Disorder Class: Normal  
 Pharmacy: [address] [place], [place] [address] Ph #: [phone] Route: Oral  
  
Patient Instructions Cataract Surgery: Before Your Surgery What is cataract surgery? Cataracts are cloudy areas in the lens of your eye. Your lens is behind the colored part of your eye (iris).  Its job is to focus light onto the back of your eye. In some people, cataracts prevent light from reaching the back of the eye. This can cause vision problems. Cataract surgery helps you see better. It replaces your natural lens, which has become cloudy, with a clear artificial one. There are two types of cataract surgery. Phacoemulsification (say \"agic-dc-hg-lmx-yhb-cck-LATA-shun\") is the most common type. The doctor makes a small cut in your eye. This cut is called an incision. The doctor uses a special ultrasound tool to break your cloudy lens apart. Sometimes a laser is used too. Then he or she removes the small pieces of the lens through the incision. In most cases, the doctor then inserts an artificial lens through the incision. Most people do not need stitches, because the incision is so small. If the doctor is not able to put in an artificial lens, you can wear a contact lens or thick glasses in place of your natural lens. Extracapsular extraction is a less common type of cataract surgery. The doctor makes a larger incision to remove the whole lens at once. After the doctor removes the lens, he or she stitches up the incision. Recovery from this type of surgery takes longer. Before either surgery, the doctor puts numbing drops in your eye. Some doctors use a shot instead. You may also get medicine to make you feel relaxed. You probably will not feel much pain. The surgery takes about 20 to 40 minutes. After surgery, you may have a bandage or shield on your eye. You will probably go home from surgery after 1 hour in the recovery room. Most people see better in 1 to 3 days. You may be able to go back to work or your normal routine in a few days. It could take 3 to 10 weeks for your eye to completely heal. After your eye heals, you may still need to wear glasses, especially for reading. Follow-up care is a key part of your treatment and safety.  Be sure to make and go to all appointments, and call your doctor if you are having problems. It's also a good idea to know your test results and keep a list of the medicines you take. What happens before surgery? 
 Surgery can be stressful. This information will help you understand what you can expect. And it will help you safely prepare for surgery. 
 Preparing for surgery 
  · Understand exactly what surgery is planned, along with the risks, benefits, and other options. · Tell your doctors ALL the medicines, vitamins, supplements, and herbal remedies you take. Some of these can increase the risk of bleeding or interact with anesthesia.  
  · If you take blood thinners, such as warfarin (Coumadin), clopidogrel (Plavix), or aspirin, be sure to talk to your doctor. He or she will tell you if you should stop taking these medicines before your surgery. Make sure that you understand exactly what your doctor wants you to do.  
  · Your doctor will tell you which medicines to take or stop before your surgery. You may need to stop taking certain medicines a week or more before surgery. So talk to your doctor as soon as you can.  
  · If you have an advance directive, let your doctor know. It may include a living will and a durable power of  for health care. Bring a copy to the hospital. If you don't have one, you may want to prepare one. It lets your doctor and loved ones know your health care wishes. Doctors advise that everyone prepare these papers before any type of surgery or procedure. What happens on the day of surgery? · Follow the instructions exactly about when to stop eating and drinking. If you don't, your surgery may be canceled. If your doctor told you to take your medicines on the day of surgery, take them with only a sip of water.  
  · Take a bath or shower before you come in for your surgery. Do not apply lotions, perfumes, deodorants, or nail polish.  
  · Take off all jewelry and piercings. And take out contact lenses, if you wear them.  At the hospital or surgery center · Bring a picture ID.  
  · The area for surgery is often marked to make sure there are no errors.  
  · You will be kept comfortable and safe by your anesthesia provider. The anesthesia may make you sleep. Or it may just numb the area being worked on.  
  · The surgery will take about 20 to 40 minutes. Going home · Be sure you have someone to drive you home. Anesthesia and pain medicine make it unsafe for you to drive.  
  · You will be given more specific instructions about recovering from your surgery. They will cover things like diet, wound care, follow-up care, driving, and getting back to your normal routine.  
  · You may have a bandage or patch over your eye. You may also have a clear shield over your eye. This prevents you from rubbing it. When should you call your doctor? · You have questions or concerns.  
  · You don't understand how to prepare for your surgery.  
  · You become ill before the surgery (such as fever, flu, or a cold).  
  · You need to reschedule or have changed your mind about having the surgery. Where can you learn more? Go to http://stanley-bertin.info/. Enter K474 in the search box to learn more about \"Cataract Surgery: Before Your Surgery. \" Current as of: December 3, 2017 Content Version: 11.7 © 7424-8434 [employer]orporated. Care instructions adapted under license by [employer] (which disclaims liability or warranty for this information). If you have questions about a medical condition or this instruction, always ask your healthcare professional. [place] [address] any warranty or liability for your use of this information. Introducing hospitals & HEALTH SERVICES! Will Soni introduces The [employer] patient portal. Now you can access parts of your medical record, email your doctor's office, and request medication refills online.    
 
1. In your internet browser, go to [email or web address] 2. Click on the First Time User? Click Here link in the Sign In box. You will see the New Member Sign Up page. 3. Enter your [employer] Access Code exactly as it appears below. You will not need to use this code after youve completed the sign-up process. If you do not sign up before the expiration date, you must request a new code. · [employer] Access Code: [email or web address] Expires: 11/18/2018  2:59 PM 
 
4. Enter the last four digits of your Social Security Number (xxxx) and Date of Birth (mm/dd/yyyy) as indicated and click Submit. You will be taken to the next sign-up page. 5. Create a Mol[employer]xt ID. This will be your [employer] login ID and cannot be changed, so think of one that is secure and easy to remember. 6. Create a [employer] password. You can change your password at any time. 7. Enter your Password Reset Question and Answer. This can be used at a later time if you forget your password. 8. Enter your e-mail address. You will receive e-mail notification when new information is available in [address] 9. Click Sign Up. You can now view and download portions of your medical record. 10. Click the Download Summary menu link to download a portable copy of your medical information. If you have questions, please visit the Frequently Asked Questions section of the [employer] website. Remember, [employer] is NOT to be used for urgent needs. For medical emergencies, dial 911. Now available from your iPhone and Android! Please provide this summary of care documentation to your next provider. Your primary care clinician is listed as Alana Cedeño. If you have any questions after today's visit, please call [phone].

## 2018-08-27 NOTE — PROGRESS NOTES
Patient: Phil Lomas MRN: [id number]  SSN: [id number]    YOB: 1980  Age: 40 y.o. Sex: female      Chief Complaint   Patient presents with    Medication Refill    Pre-op Exam     Phil Lomas is a 40 y.o. female who presents for pre-cataract clearance. Patient says she now has health insurance. Patient with hx of CMT, headaches, anxiety/ depression and tobacco abuse. She asks to restart medications for anxiety and depression. Advised I would only prescribe a limited supply of alprazolam.     Medications:     Current Outpatient Prescriptions   Medication Sig    DULoxetine (CYMBALTA) 20 mg capsule Take 1 Cap by mouth daily.  cyclobenzaprine (FLEXERIL) 10 mg tablet Take 1 Tab by mouth three (3) times daily as needed.  ibuprofen (MOTRIN) 800 mg tablet Take 1 Tab by mouth every eight (8) hours as needed. Indications: Headache Disorder    ALPRAZolam (XANAX) 0.5 mg tablet Take 1 Tab by mouth three (3) times daily as needed for Anxiety. Max Daily Amount: 1.5 mg.    acetaminophen (TYLENOL) 500 mg tablet Take 2 Tabs by mouth three (3) times daily as needed for Pain. Alternate with motrin. No current facility-administered medications for this visit. 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    Depression with anxiety 07/11/2014    Anemia 07/11/2014    Tobacco abuse 07/11/2014       Medical History:     Past Medical History:   Diagnosis Date    ADD (attention deficit disorder)     Anemia     Anxiety     Binge-eating and purging type anorexia nervosa     CMT (Charcot-Anabella-Tooth disease)     Depression        Allergies:      Allergies   Allergen Reactions    Aspirin Hives and Unknown (comments)     headaches       Surgical History:     Past Surgical History:   Procedure Laterality Date    BREAST SURGERY PROCEDURE UNLISTED      HX GYN         Social History:     Social History     Social History    Marital status:      Spouse name: N/A    Number of children: N/A    Years of education: N/A     Social History Main Topics    Smoking status: Current Every Day Smoker    Smokeless tobacco: Never Used    Alcohol use Yes      Comment: once in a while    Drug use: No    Sexual activity: Yes     Partners: Male     Other Topics Concern    None     Social History Narrative       Review of Symptoms:  Constitutional: Negative for fever, chills, fatigue, malaise  Skin: Negative for rash or lesion  HEENT: see HPI  CV: Negative for chest pain or palpitations  Resp: Negative for cough, wheezing or SOB  Gastrointestinal: Negative for nausea or abdominal pain  Musculoskeletal: see HPI  Neurological: Negative for weakness or paresthesia  Psych: see HPI      Vitals:    08/20/18 1438   BP: 96/55   Pulse: 89   Resp: 20   Temp: 99.3 °F (37.4 °C)   TempSrc: Oral   SpO2: 96%   Weight: 106 lb 3.2 oz (48.2 kg)   Height: 5' 2\" (1.575 m)       Physical Examination:  General: Underweight, in no acute distress  Skin: Warm and dry, no rash or lesion appreciated  Head: Normocephalic, atraumatic  Eyes: Sclera clear, EOMI  Oropharynx: edentulous   Neck: full ROM   Respiratory: CTAB with symmetrical, unlabored effort  Cardiovascular:  Normal S1, S2, regular rate and rhythm  Extremities: Full range of motion, no edema  Neurological: Normal strength and sensation, No focal deficits  Psychological: Active, alert and oriented. Affect appropriate        Diagnoses and all orders for this visit:    1. Cataract of both eyes, unspecified cataract type    2. Pre-procedural examination    3. Depression with anxiety  -     DULoxetine (CYMBALTA) 20 mg capsule; Take 1 Cap by mouth daily.  -     ALPRAZolam (XANAX) 0.5 mg tablet; Take 1 Tab by mouth three (3) times daily as needed for Anxiety. Max Daily Amount: 1.5 mg.    4. Cervicalgia  -     cyclobenzaprine (FLEXERIL) 10 mg tablet;  Take 1 Tab by mouth three (3) times daily as needed. 5. Nonintractable episodic headache, unspecified headache type  -     ibuprofen (MOTRIN) 800 mg tablet; Take 1 Tab by mouth every eight (8) hours as needed. Indications: Headache Disorder      Plan of care:  Diagnoses were discussed in detail with patient. Medication risks/benefits/side effects discussed with patient. All of the patient's questions were addressed and answered to apparent satisfaction. The patient understands and agrees with our plan of care. The patient knows to call back if they have questions about the plan of care or if symptoms change. The patient received an After-Visit Summary which contains VS, diagnoses, orders, allergy and medication lists. No future appointments.       Follow-up Disposition: Not on File

## 2019-04-01 ENCOUNTER — OFFICE VISIT (OUTPATIENT)
Dept: FAMILY MEDICINE CLINIC | Age: 39
End: 2019-04-01

## 2019-04-01 ENCOUNTER — TELEPHONE (OUTPATIENT)
Dept: FAMILY MEDICINE CLINIC | Age: 39
End: 2019-04-01

## 2019-04-01 VITALS
TEMPERATURE: 98.4 F | WEIGHT: 132.8 LBS | HEART RATE: 94 BPM | BODY MASS INDEX: 24.44 KG/M2 | SYSTOLIC BLOOD PRESSURE: 109 MMHG | RESPIRATION RATE: 16 BRPM | DIASTOLIC BLOOD PRESSURE: 69 MMHG | HEIGHT: 62 IN | OXYGEN SATURATION: 99 %

## 2019-04-01 DIAGNOSIS — F41.8 DEPRESSION WITH ANXIETY: ICD-10-CM

## 2019-04-01 DIAGNOSIS — D64.9 ANEMIA, UNSPECIFIED TYPE: ICD-10-CM

## 2019-04-01 DIAGNOSIS — G60.0 CMT (CHARCOT-MARIE-TOOTH DISEASE): Primary | ICD-10-CM

## 2019-04-01 RX ORDER — DULOXETIN HYDROCHLORIDE 20 MG/1
20 CAPSULE, DELAYED RELEASE ORAL DAILY
Qty: 30 CAP | Refills: 2 | Status: SHIPPED | OUTPATIENT
Start: 2019-04-01 | End: 2019-08-30 | Stop reason: SDUPTHER

## 2019-04-01 NOTE — PROGRESS NOTES
[address] [facility], [address] [place] [address]  [phone]           Progress Note    Patient: Yary Flores MRN: [id number]  SSN: [id number]    YOB: 1980  Age: 41 y.o. Sex: female        Chief Complaint   Patient presents with    Hand Pain     Left -- 1 month          Subjective:     Encounter Diagnoses   Name Primary?  CMT (Charcot-Anabella-Tooth disease): This is her first visit to see me. She has a long-standing history of CMT. She has severe involvement of her lower extremities and worsening involvement of her hands. The last several weeks he has had increasing pain in her first second and third digits of the left hand. That this is a median nerve distribution. Says the Lyrica is not helping the pain. Is been no trauma and the only thing that she does that may contribute to this is text a lot on her phone. She has not had B12 or B6 levels done to make sure that she has no complicating factors to this EMT. Worried about this because she has a concomitant anemia. Yes    Depression with anxiety: She is currently off her Cymbalta. She would have had to run out in November according to the records. I am not sure why she stopped. He complains bitterly that the Lyrica is making her gain weight. She was put on Lyrica by her orthopedic specialist who apparently plans some sort of foot surgery in the future. She said his name was Dr. Bharath Acuña and he has an office in a [place].  Anemia, unspecified type:   No sx. Lab Results   Component Value Date/Time    HGB 12.6 12/10/2014 02:16 PM     No results found for: IRON, FE, TIBC, IBCT, PSAT, FERR                Current and past medical information:    Current Medications after this visit[de-identified]     Current Outpatient Medications   Medication Sig    pregabalin (LYRICA PO) Take  by mouth.  DULoxetine (CYMBALTA) 20 mg capsule Take 1 Cap by mouth daily.  Indications: Neuropathic Pain    cyclobenzaprine (FLEXERIL) 10 mg tablet Take 1 Tab by mouth three (3) times daily as needed.  ibuprofen (MOTRIN) 800 mg tablet Take 1 Tab by mouth every eight (8) hours as needed. Indications: Headache Disorder    ALPRAZolam (XANAX) 0.5 mg tablet Take 1 Tab by mouth three (3) times daily as needed for Anxiety. Max Daily Amount: 1.5 mg.    acetaminophen (TYLENOL) 500 mg tablet Take 2 Tabs by mouth three (3) times daily as needed for Pain. Alternate with motrin. No current facility-administered medications for this visit. 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    Depression with anxiety 07/11/2014    Anemia 07/11/2014    Tobacco abuse 07/11/2014       Past Medical History:   Diagnosis Date    ADD (attention deficit disorder)     Anemia     Anxiety     Binge-eating and purging type anorexia nervosa     CMT (Charcot-Anabella-Tooth disease)     Depression        Allergies   Allergen Reactions    Aspirin Hives and Unknown (comments)     headaches       Past Surgical History:   Procedure Laterality Date    BREAST SURGERY PROCEDURE UNLISTED      HX GYN         Social History     Socioeconomic History    Marital status:      Spouse name: Not on file    Number of children: Not on file    Years of education: Not on file    Highest education level: Not on file   Tobacco Use    Smoking status: Former Smoker    Smokeless tobacco: Never Used   Substance and Sexual Activity    Alcohol use: Yes     Comment: once in a while    Drug use: No    Sexual activity: Yes     Partners: Male       Review of Systems   Constitutional: Negative. Negative for chills, fever, malaise/fatigue and weight loss. HENT: Negative. Negative for hearing loss. Eyes: Negative. Negative for blurred vision and double vision. Respiratory: Negative.   Negative for cough, hemoptysis, sputum production and shortness of breath. Cardiovascular: Negative. Negative for chest pain, palpitations and orthopnea. Gastrointestinal: Negative. Negative for abdominal pain, blood in stool, heartburn, nausea and vomiting. Genitourinary: Negative. Negative for dysuria, frequency and urgency. Musculoskeletal: Positive for myalgias. Negative for back pain, falls and neck pain. She has chronic pain from peripheral neuropathy. She is in a walking cast for her right foot and ankle. He also takes Flexeril for chronic muscular pain. Skin: Negative. Negative for rash. Neurological: Positive for tingling, sensory change and weakness. Negative for dizziness, tremors and headaches. She can have numbness tingling and sensation of the extremity \"falling asleep\" in either hand or foot. Endo/Heme/Allergies: Negative. Psychiatric/Behavioral: Positive for depression. Negative for substance abuse and suicidal ideas. She is a smoker. Objective:     Vitals:    04/01/19 1056   BP: 109/69   Pulse: 94   Resp: 16   Temp: 98.4 °F (36.9 °C)   TempSrc: Oral   SpO2: 99%   Weight: 132 lb 12.8 oz (60.2 kg)   Height: 5' 2\" (1.575 m)      Body mass index is 24.29 kg/m². Physical Exam   Constitutional: She is oriented to person, place, and time and well-developed, well-nourished, and in no distress. No distress. HENT:   Head: Normocephalic and atraumatic. Mouth/Throat: Oropharynx is clear and moist.   Eyes: Conjunctivae are normal.   Neck: No thyromegaly present. Cardiovascular: Normal rate and regular rhythm. Pulmonary/Chest: Effort normal. No respiratory distress. Abdominal: Soft. She exhibits no distension. Musculoskeletal: She exhibits no edema. Strength in her left hand is decreased. Her Tinel's and Phalen's tests were both negative. She has no evidence of trauma and no bony tenderness.   She has a full range of motion at the wrist.   Neurological: She is alert and oriented to person, place, and time. Skin: Skin is warm. No rash noted. She is not diaphoretic. No erythema. Psychiatric: Mood and affect normal.   Nursing note and vitals reviewed. Health Maintenance Due   Topic Date Due    DTaP/Tdap/Td series (1 - Tdap) 10/13/2002    PAP AKA CERVICAL CYTOLOGY  03/19/2017         Assessment and orders:     Encounter Diagnoses     ICD-10-CM ICD-9-CM   1. CMT (Charcot-Anabella-Tooth disease) G60.0 356.1   2. Depression with anxiety F41.8 300.4   3. Anemia, unspecified type D64.9 285.9     Diagnoses and all orders for this visit:    1. CMT (Charcot-Anabella-Tooth disease)-very complicated patient. She is concerned about her weight gain with Lyrica. I do not know her current Lyrica dose. She will call back with that information and we will decide on how to decrease her Lyrica since we are restarting her Cymbalta. The Cymbalta should help more with the pain. She is now off of amitriptyline altogether. Rule out coexistent vitamin B deficiencies. -     VITAMIN B12  -     VITAMIN B6    2. Depression with anxiety-she thought Cymbalta was exclusively for depression but I told her it was also to help with chronic pain from neuropathic conditions. -     DULoxetine (CYMBALTA) 20 mg capsule; Take 1 Cap by mouth daily. Indications: Neuropathic Pain    3. Anemia, unspecified type-this needs to be retested  -     CBC WITH AUTOMATED DIFF; Future      Plan of care:  Discussed diagnoses in detail with patient. Medication risks/benefits/side effects discussed with patient. All of the patient's questions were addressed. The patient understands and agrees with our plan of care. The patient knows to call back if they are unsure of or forget any changes we discussed today or if the symptoms change.      The patient received an After-Visit Summary which contains VS, orders, medication list and allergy list. This can be used as a \"mini-medical record\" should they have to seek medical care while out of [facility].    Patient Care Team:  Zandra Lozada MD as PCP - [facility])    Follow-up and Dispositions    · Return in about 3 weeks (around 4/22/2019) for Dr. Huizar Seen. No future appointments. Signed By: Chandni Geiger MD     April 1, 2019        This note was created using voice recognition software. Despite editing, there may be syntax errors.

## 2019-04-01 NOTE — PROGRESS NOTES
Chief Complaint   Patient presents with    Hand Pain     Left -- 1 month      Body mass index is 24.29 kg/m². 1. Have you been to the ER, urgent care clinic since your last visit? Hospitalized since your last visit? No    2. Have you seen or consulted any other health care providers outside of the [address] [place] [address] since your last visit? Include any pap smears or colon screening.  No    Reviewed record in preparation for visit and have necessary documentation  Pt did not bring medication to office visit for review  Information was given to pt on Advanced Directives, Living Will  Information was given on Shingles Vaccine  Opportunity was given for questions  Goals that were addressed and/or need to be completed after this appointment include:     Health Maintenance Due   Topic Date Due    Pneumococcal 0-64 years (1 of 1 - PPSV23) 10/13/1987    DTaP/Tdap/Td series (1 - Tdap) 10/13/2002    PAP AKA CERVICAL CYTOLOGY  03/19/2017

## 2019-04-01 NOTE — PATIENT INSTRUCTIONS
Neuropathic Pain: Care Instructions  Your Care Instructions    Neuropathic pain is caused by pressure on or damage to your nerves. It's often simply called nerve pain. Some people feel this type of pain all the time. For others, it comes and goes. Diabetes, shingles, or an injury can cause nerve pain. Many people say the pain feels sharp, burning, or stabbing. But some people feel it as a dull ache. In some cases, it makes your skin very sensitive. So touch, pressure, and other sensations that did not hurt before may now cause pain. It's important to know that this kind of pain is real and can affect your quality of life. It's also important to know that treatment can help. Treatment includes pain medicines, exercise, and physical therapy. Medicines can help reduce the number of pain signals that travel over the nerves. This can make the painful areas less sensitive. It can also help you sleep better and improve your mood. But medicines are only one part of successful treatment. Most people do best with more than one kind of treatment. Your doctor may recommend that you try cognitive-behavioral therapy and stress management. Or, if needed, you may decide to try to quit smoking, lower your blood pressure, or better control blood sugar. These kinds of healthy changes can also make a difference. If you feel that your treatment is not working, talk to your doctor. And be sure to tell your doctor if you think you might be depressed or anxious. These are common problems that can also be treated. Follow-up care is a key part of your treatment and safety. Be sure to make and go to all appointments, and call your doctor if you are having problems. It's also a good idea to know your test results and keep a list of the medicines you take. How can you care for yourself at home? · Be safe with medicines. Read and follow all instructions on the label.   ? If the doctor gave you a prescription medicine for pain, take it as prescribed. ? If you are not taking a prescription pain medicine, ask your doctor if you can take an over-the-counter medicine. · Save hard tasks for days when you have less pain. Follow a hard task with an easy task. And remember to take breaks. · Relax, and reduce stress. You may want to try deep breathing or meditation. These can help. · Keep moving. Gentle, daily exercise can help reduce pain. Your doctor or physical therapist can tell you what type of exercise is best for you. This may include walking, swimming, and stationary biking. It may also include stretches and range-of-motion exercises. · Try heat, cold packs, and massage. · Get enough sleep. Constant pain can make you more tired. If the pain makes it hard to sleep, talk with your doctor. · Think positively. Your thoughts can affect your pain. Do fun things to distract yourself from the pain. See a movie, read a book, listen to music, or spend time with a friend. · Keep a pain diary. Try to write down how strong your pain is and what it feels like. Also try to notice and write down how your moods, thoughts, sleep, activities, and medicine affect your pain. These notes can help you and your doctor find the best ways to treat your pain. Reducing constipation caused by pain medicine  Pain medicines often cause constipation. To reduce constipation:  · Include fruits, vegetables, beans, and whole grains in your diet each day. These foods are high in fiber. · Drink plenty of fluids, enough so that your urine is light yellow or clear like water. If you have kidney, heart, or liver disease and have to limit fluids, talk with your doctor before you increase the amount of fluids you drink. · Get some exercise every day. Build up slowly to 30 to 60 minutes a day on 5 or more days of the week. · Take a fiber supplement, such as Citrucel or Metamucil, every day if needed. Read and follow all instructions on the label.   · Schedule time each day for a bowel movement. Having a daily routine may help. Take your time and do not strain when having a bowel movement. · Ask your doctor about a laxative. The goal is to have one easy bowel movement every 1 to 2 days. Do not let constipation go untreated for more than 3 days. When should you call for help? Call your doctor now or seek immediate medical care if:    · You feel sad, anxious, or hopeless for more than a few days. This could mean you are depressed. Depression is common in people who have a lot of pain. But it can be treated.     · You have trouble with bowel movements, such as:  ? No bowel movement in 3 days. ? Blood in the anal area, in your stool, or on the toilet paper. ? Diarrhea for more than 24 hours.    Watch closely for changes in your health, and be sure to contact your doctor if:    · Your pain is getting worse.     · You can't sleep because of pain.     · You are very worried or anxious about your pain.     · You have trouble taking your pain medicine.     · You have any concerns about your pain medicine or its side effects.     · You have vomiting or cramps for more than 2 hours. Where can you learn more? Go to http://stanley-bertin.info/. Enter G402 in the search box to learn more about \"Neuropathic Pain: Care Instructions. \"  Current as of: Kayli 3, 2018  Content Version: 11.9  © 2011-6769 [employer]. Care instructions adapted under license by [employer] (which disclaims liability or warranty for this information). If you have questions about a medical condition or this instruction, always ask your healthcare professional. [place] [address] any warranty or liability for your use of this information. Neuropathic Pain: Care Instructions  Your Care Instructions    Neuropathic pain is caused by pressure on or damage to your nerves. It's often simply called nerve pain. Some people feel this type of pain all the time.  For others, it comes and goes. Diabetes, shingles, or an injury can cause nerve pain. Many people say the pain feels sharp, burning, or stabbing. But some people feel it as a dull ache. In some cases, it makes your skin very sensitive. So touch, pressure, and other sensations that did not hurt before may now cause pain. It's important to know that this kind of pain is real and can affect your quality of life. It's also important to know that treatment can help. Treatment includes pain medicines, exercise, and physical therapy. Medicines can help reduce the number of pain signals that travel over the nerves. This can make the painful areas less sensitive. It can also help you sleep better and improve your mood. But medicines are only one part of successful treatment. Most people do best with more than one kind of treatment. Your doctor may recommend that you try cognitive-behavioral therapy and stress management. Or, if needed, you may decide to try to quit smoking, lower your blood pressure, or better control blood sugar. These kinds of healthy changes can also make a difference. If you feel that your treatment is not working, talk to your doctor. And be sure to tell your doctor if you think you might be depressed or anxious. These are common problems that can also be treated. Follow-up care is a key part of your treatment and safety. Be sure to make and go to all appointments, and call your doctor if you are having problems. It's also a good idea to know your test results and keep a list of the medicines you take. How can you care for yourself at home? · Be safe with medicines. Read and follow all instructions on the label. ? If the doctor gave you a prescription medicine for pain, take it as prescribed. ? If you are not taking a prescription pain medicine, ask your doctor if you can take an over-the-counter medicine. · Save hard tasks for days when you have less pain. Follow a hard task with an easy task. And remember to take breaks. · Relax, and reduce stress. You may want to try deep breathing or meditation. These can help. · Keep moving. Gentle, daily exercise can help reduce pain. Your doctor or physical therapist can tell you what type of exercise is best for you. This may include walking, swimming, and stationary biking. It may also include stretches and range-of-motion exercises. · Try heat, cold packs, and massage. · Get enough sleep. Constant pain can make you more tired. If the pain makes it hard to sleep, talk with your doctor. · Think positively. Your thoughts can affect your pain. Do fun things to distract yourself from the pain. See a movie, read a book, listen to music, or spend time with a friend. · Keep a pain diary. Try to write down how strong your pain is and what it feels like. Also try to notice and write down how your moods, thoughts, sleep, activities, and medicine affect your pain. These notes can help you and your doctor find the best ways to treat your pain. Reducing constipation caused by pain medicine  Pain medicines often cause constipation. To reduce constipation:  · Include fruits, vegetables, beans, and whole grains in your diet each day. These foods are high in fiber. · Drink plenty of fluids, enough so that your urine is light yellow or clear like water. If you have kidney, heart, or liver disease and have to limit fluids, talk with your doctor before you increase the amount of fluids you drink. · Get some exercise every day. Build up slowly to 30 to 60 minutes a day on 5 or more days of the week. · Take a fiber supplement, such as Citrucel or Metamucil, every day if needed. Read and follow all instructions on the label. · Schedule time each day for a bowel movement. Having a daily routine may help. Take your time and do not strain when having a bowel movement. · Ask your doctor about a laxative. The goal is to have one easy bowel movement every 1 to 2 days.  Do not let constipation go untreated for more than 3 days. When should you call for help? Call your doctor now or seek immediate medical care if:    · You feel sad, anxious, or hopeless for more than a few days. This could mean you are depressed. Depression is common in people who have a lot of pain. But it can be treated.     · You have trouble with bowel movements, such as:  ? No bowel movement in 3 days. ? Blood in the anal area, in your stool, or on the toilet paper. ? Diarrhea for more than 24 hours.    Watch closely for changes in your health, and be sure to contact your doctor if:    · Your pain is getting worse.     · You can't sleep because of pain.     · You are very worried or anxious about your pain.     · You have trouble taking your pain medicine.     · You have any concerns about your pain medicine or its side effects.     · You have vomiting or cramps for more than 2 hours. Where can you learn more? Go to http://stanley-bertin.info/. Enter B597 in the search box to learn more about \"Neuropathic Pain: Care Instructions. \"  Current as of: Kayli 3, 2018  Content Version: 11.9  © 2155-9231 [employer]. Care instructions adapted under license by [employer] (which disclaims liability or warranty for this information). If you have questions about a medical condition or this instruction, always ask your healthcare professional. [address] any warranty or liability for your use of this information.

## 2019-04-01 NOTE — TELEPHONE ENCOUNTER
----- Message from Tamar Montes sent at 4/1/2019  1:42 PM EDT -----  Regarding: Dr. Raul Garcias   Pt is calling back to let the PCP know she was taking 150 mg of Lyrica currently. Best contact number is [phone].

## 2019-04-01 NOTE — TELEPHONE ENCOUNTER
Called and spoke to patient. Advised her per Dr. Ayana Lawson:  Try taking one lyrica 150 mg at bedtime only, stop the AM dose. She verbalized understanding.

## 2019-04-02 LAB
BASOPHILS # BLD AUTO: 0 X10E3/UL (ref 0–0.2)
BASOPHILS NFR BLD AUTO: 0 %
EOSINOPHIL # BLD AUTO: 0.2 X10E3/UL (ref 0–0.4)
EOSINOPHIL NFR BLD AUTO: 4 %
ERYTHROCYTE [DISTWIDTH] IN BLOOD BY AUTOMATED COUNT: 13.3 % (ref 12.3–15.4)
HCT VFR BLD AUTO: 34.3 % (ref 34–46.6)
HGB BLD-MCNC: 11.6 G/DL (ref 11.1–15.9)
IMM GRANULOCYTES # BLD AUTO: 0 X10E3/UL (ref 0–0.1)
IMM GRANULOCYTES NFR BLD AUTO: 0 %
LYMPHOCYTES # BLD AUTO: 1.5 X10E3/UL (ref 0.7–3.1)
LYMPHOCYTES NFR BLD AUTO: 27 %
MCH RBC QN AUTO: 33.7 PG (ref 26.6–33)
MCHC RBC AUTO-ENTMCNC: 33.8 G/DL (ref 31.5–35.7)
MCV RBC AUTO: 100 FL (ref 79–97)
MONOCYTES # BLD AUTO: 0.4 X10E3/UL (ref 0.1–0.9)
MONOCYTES NFR BLD AUTO: 8 %
NEUTROPHILS # BLD AUTO: 3.3 X10E3/UL (ref 1.4–7)
NEUTROPHILS NFR BLD AUTO: 61 %
PLATELET # BLD AUTO: 241 X10E3/UL (ref 150–379)
RBC # BLD AUTO: 3.44 X10E6/UL (ref 3.77–5.28)
WBC # BLD AUTO: 5.4 X10E3/UL (ref 3.4–10.8)

## 2019-04-04 LAB
VIT B12 SERPL-MCNC: 485 PG/ML (ref 232–1245)
VIT B6 SERPL-MCNC: 26.3 UG/L (ref 2–32.8)

## 2019-04-26 ENCOUNTER — TELEPHONE (OUTPATIENT)
Dept: FAMILY MEDICINE CLINIC | Age: 39
End: 2019-04-26

## 2019-04-26 NOTE — TELEPHONE ENCOUNTER
Returned call to patient who inquired about Lovenox shot and how to inject. Explained procedure and since this was prescribed by Surgeon she should call their office to discuss. Patient expressed understanding.

## 2019-04-26 NOTE — TELEPHONE ENCOUNTER
Pt states she needs to talk to Nurse Nik Solders about a shot that she has to take. It is an injection and she does not like needles.  Please call Pt.injection

## 2019-08-30 ENCOUNTER — OFFICE VISIT (OUTPATIENT)
Dept: FAMILY MEDICINE CLINIC | Age: 39
End: 2019-08-30

## 2019-08-30 VITALS
WEIGHT: 121 LBS | DIASTOLIC BLOOD PRESSURE: 64 MMHG | RESPIRATION RATE: 24 BRPM | HEIGHT: 62 IN | TEMPERATURE: 99.1 F | OXYGEN SATURATION: 97 % | BODY MASS INDEX: 22.26 KG/M2 | HEART RATE: 94 BPM | SYSTOLIC BLOOD PRESSURE: 94 MMHG

## 2019-08-30 DIAGNOSIS — G60.0 CMT (CHARCOT-MARIE-TOOTH DISEASE): ICD-10-CM

## 2019-08-30 DIAGNOSIS — M54.2 CERVICALGIA: ICD-10-CM

## 2019-08-30 DIAGNOSIS — M79.671 RIGHT FOOT PAIN: ICD-10-CM

## 2019-08-30 DIAGNOSIS — F41.8 DEPRESSION WITH ANXIETY: Primary | ICD-10-CM

## 2019-08-30 RX ORDER — IBUPROFEN 800 MG/1
800 TABLET ORAL
Qty: 30 TAB | Refills: 2 | Status: SHIPPED | OUTPATIENT
Start: 2019-08-30 | End: 2021-03-30

## 2019-08-30 RX ORDER — CYCLOBENZAPRINE HCL 10 MG
10 TABLET ORAL
Qty: 30 TAB | Refills: 1 | Status: SHIPPED | OUTPATIENT
Start: 2019-08-30 | End: 2020-02-25 | Stop reason: SDUPTHER

## 2019-08-30 RX ORDER — BUSPIRONE HYDROCHLORIDE 10 MG/1
10 TABLET ORAL 3 TIMES DAILY
Qty: 90 TAB | Refills: 0 | Status: SHIPPED | OUTPATIENT
Start: 2019-08-30 | End: 2020-12-28 | Stop reason: SDUPTHER

## 2019-08-30 RX ORDER — DULOXETIN HYDROCHLORIDE 20 MG/1
20 CAPSULE, DELAYED RELEASE ORAL DAILY
Qty: 30 CAP | Refills: 2 | Status: SHIPPED | OUTPATIENT
Start: 2019-08-30 | End: 2020-12-28 | Stop reason: SDUPTHER

## 2019-08-30 NOTE — PROGRESS NOTES
1. Have you been to the ER, urgent care clinic since your last visit? Hospitalized since your last visit? No    2. Have you seen or consulted any other health care providers outside of the [address] [place] [address] since your last visit? Include any pap smears or colon screening.  Yes When: [facility], right foot surgery, 04/2019  Reviewed record in preparation for visit and have necessary documentation  Pt did not bring medication to office visit for review    Goals that were addressed and/or need to be completed during or after this appointment include     Health Maintenance Due   Topic Date Due    DTaP/Tdap/Td series (1 - Tdap) 10/13/2002    PAP AKA CERVICAL CYTOLOGY  03/19/2017    Influenza Age 9 to Adult  08/01/2019

## 2019-08-30 NOTE — PROGRESS NOTES
Patient: Balbir Alfred MRN: [id number]  SSN: [id number]    YOB: 1980  Age: 41 y.o. Sex: female      Chief Complaint   Patient presents with    Medication Refill    Foot Pain     right     Balbir Alfred is a 41 y.o. female who presents for medication refills. Patient last seen by me 1 year ago. Patient  now has health insurance. Patient with hx of CMT, headaches, anxiety/ depression and tobacco abuse. She asks for refills of medications for anxiety and depression. She has had a right foot surgery done by Dr. Antonio Rutledge at [facility] on 4/24/19 for her CMT. She is now on 150 mg pregabalin daily and complains of continued pain. Asks for refill of ibuprofen and cyclobenzaprine which I have previously written for patient. I advised that I could refer her to pain management in [place]. Medications:     Current Outpatient Medications   Medication Sig    DULoxetine (CYMBALTA) 20 mg capsule Take 1 Cap by mouth daily. Indications: Neuropathic Pain    cyclobenzaprine (FLEXERIL) 10 mg tablet Take 1 Tab by mouth three (3) times daily as needed (Pain).  ibuprofen (MOTRIN) 800 mg tablet Take 1 Tab by mouth every eight (8) hours as needed for Pain. Indications: head pain    busPIRone (BUSPAR) 10 mg tablet Take 1 Tab by mouth three (3) times daily.  pregabalin (LYRICA PO) Take  by mouth.  acetaminophen (TYLENOL) 500 mg tablet Take 2 Tabs by mouth three (3) times daily as needed for Pain. Alternate with motrin. No current facility-administered medications for this visit.         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    Depression with anxiety 07/11/2014    Anemia 07/11/2014    Tobacco abuse 07/11/2014       Medical History:     Past Medical History:   Diagnosis Date    ADD (attention deficit disorder)     Anemia     Anxiety     Binge-eating and purging type anorexia nervosa     CMT (Charcot-Anabella-Tooth disease)     Depression        Allergies: Allergies   Allergen Reactions    Aspirin Hives and Unknown (comments)     headaches       Surgical History:     Past Surgical History:   Procedure Laterality Date    BREAST SURGERY PROCEDURE UNLISTED      HX GYN      HX ORTHOPAEDIC Right 04/24/2019    major reconstruction of right foot       Social History:     Social History     Socioeconomic History    Marital status:      Spouse name: Not on file    Number of children: Not on file    Years of education: Not on file    Highest education level: Not on file   Tobacco Use    Smoking status: Former Smoker    Smokeless tobacco: Never Used   Substance and Sexual Activity    Alcohol use: Yes     Comment: once in a while    Drug use: No    Sexual activity: Yes     Partners: Male       Review of Symptoms:  Constitutional: Negative for fever, chills, fatigue, malaise  Skin: Negative for rash or lesion  HEENT: Negative for acute hearing or vision changes  CV: Negative for chest pain or palpitations  Resp: Negative for cough, wheezing or SOB  Gastrointestinal: Negative for nausea or abdominal pain  Musculoskeletal: see HPI  Neurological: Negative for weakness or paresthesia  Psych: see HPI      Vitals:    08/30/19 1545   BP: 94/64   Pulse: 94   Resp: 24   Temp: 99.1 °F (37.3 °C)   TempSrc: Oral   SpO2: 97%   Weight: 121 lb (54.9 kg)   Height: 5' 2\" (1.575 m)       Physical Examination:  General: Underweight, in no acute distress  Skin: Warm and dry, no rash or lesion appreciated  Head: Normocephalic, atraumatic  Eyes: Sclera clear, EOMI  Oropharynx: edentulous   Neck: Full range of motion   Respiratory: CTAB with symmetrical, unlabored effort  Cardiovascular:  Normal S1, S2, regular rate and rhythm  Extremities: Right foot in walking boot, antalgic gait  Neurological: Normal strength and sensation  Psychological: Active, alert and oriented.  Affect appropriate Diagnoses and all orders for this visit:    1. Depression with anxiety  -     DULoxetine (CYMBALTA) 20 mg capsule; Take 1 Cap by mouth daily. Indications: Neuropathic Pain  -     busPIRone (BUSPAR) 10 mg tablet; Take 1 Tab by mouth three (3) times daily. 2. CMT (Charcot-Anabella-Tooth disease)  -     ibuprofen (MOTRIN) 800 mg tablet; Take 1 Tab by mouth every eight (8) hours as needed for Pain. Indications: head pain  -     REFERRAL TO PAIN MANAGEMENT    3. Right foot pain  -     REFERRAL TO PAIN MANAGEMENT    4. Cervicalgia  -     cyclobenzaprine (FLEXERIL) 10 mg tablet; Take 1 Tab by mouth three (3) times daily as needed (Pain). -     ibuprofen (MOTRIN) 800 mg tablet; Take 1 Tab by mouth every eight (8) hours as needed for Pain. Indications: head pain      Plan of care:  Diagnoses were discussed in detail with patient. Medication risks/benefits/side effects discussed with patient. All of the patient's questions were addressed and answered to apparent satisfaction. The patient understands and agrees with our plan of care. The patient knows to call back if they have questions about the plan of care or if symptoms change. The patient received an After-Visit Summary which contains VS, diagnoses, orders, allergy and medication lists. No future appointments. Follow-up and Dispositions    · Return in about 3 months (around 11/30/2019), or if symptoms worsen or fail to improve.

## 2020-02-25 ENCOUNTER — OFFICE VISIT (OUTPATIENT)
Dept: FAMILY MEDICINE CLINIC | Age: 40
End: 2020-02-25

## 2020-02-25 VITALS
HEIGHT: 62 IN | WEIGHT: 118 LBS | RESPIRATION RATE: 20 BRPM | HEART RATE: 95 BPM | DIASTOLIC BLOOD PRESSURE: 65 MMHG | BODY MASS INDEX: 21.71 KG/M2 | SYSTOLIC BLOOD PRESSURE: 100 MMHG | TEMPERATURE: 98.4 F | OXYGEN SATURATION: 97 %

## 2020-02-25 DIAGNOSIS — R68.89 FLU-LIKE SYMPTOMS: Primary | ICD-10-CM

## 2020-02-25 DIAGNOSIS — Z72.0 TOBACCO ABUSE: ICD-10-CM

## 2020-02-25 DIAGNOSIS — G60.0 CMT (CHARCOT-MARIE-TOOTH DISEASE): ICD-10-CM

## 2020-02-25 DIAGNOSIS — M54.2 CERVICALGIA: ICD-10-CM

## 2020-02-25 DIAGNOSIS — R19.7 DIARRHEA, UNSPECIFIED TYPE: ICD-10-CM

## 2020-02-25 LAB
FLUAV+FLUBV AG NOSE QL IA.RAPID: NEGATIVE POS/NEG
FLUAV+FLUBV AG NOSE QL IA.RAPID: NEGATIVE POS/NEG
VALID INTERNAL CONTROL?: YES

## 2020-02-25 RX ORDER — CYCLOBENZAPRINE HCL 10 MG
10 TABLET ORAL
Qty: 90 TAB | Refills: 1 | Status: SHIPPED | OUTPATIENT
Start: 2020-02-25 | End: 2020-06-30 | Stop reason: SDUPTHER

## 2020-02-25 RX ORDER — LOPERAMIDE HCL 2 MG
2 TABLET ORAL
Qty: 12 TAB | Refills: 0 | Status: SHIPPED | OUTPATIENT
Start: 2020-02-25 | End: 2020-03-06

## 2020-02-25 NOTE — PROGRESS NOTES
1. Have you been to the ER, urgent care clinic since your last visit? Hospitalized since your last visit? No    2. Have you seen or consulted any other health care providers outside of the [address] [place] [address] since your last visit? Include any pap smears or colon screening. No    Reviewed record in preparation for visit and have necessary documentation  Goals that were addressed and/or need to be completed during or after this appointment include     Health Maintenance Due   Topic Date Due    DTaP/Tdap/Td series (1 - Tdap) 10/13/1992    PAP AKA CERVICAL CYTOLOGY  03/19/2017    Influenza Age 5 to Adult  08/01/2019       Patient is accompanied by self I have received verbal consent from [employer] Hein to discuss any/all medical information while they are present in the room.

## 2020-02-28 NOTE — PATIENT INSTRUCTIONS

## 2020-02-28 NOTE — PROGRESS NOTES
Patient: Gayla Brittle MRN: [id number]  SSN: [id number]    YOB: 1980  Age: 46 y.o. Sex: female      Chief Complaint   Patient presents with    Generalized Body Aches    Sore Throat    Diarrhea     Gayla Brittle is a 46 y.o. female presents with complaints of sore throat, diarrhea and myalgias for several days. There has been no vomiting . she has not had  swollen glands or fever. Symptoms are moderate. Patient is drinking plenty of fluids. There is not a hx of asthma. There is a hx of allergic rhinitis. There is current tobacco abuse. There have not been contacts with similar infections. Patient with hx of depression and anxiety. Denies wanting to harm self or others. She continues to wear a post-op boot and has not followed up with ortho. She continues to lose weight. BP Readings from Last 3 Encounters:   02/25/20 100/65   08/30/19 94/64   04/01/19 109/69     Wt Readings from Last 3 Encounters:   02/25/20 118 lb (53.5 kg)   08/30/19 121 lb (54.9 kg)   04/01/19 132 lb 12.8 oz (60.2 kg)     Body mass index is 21.58 kg/m². Medications:     Current Outpatient Medications   Medication Sig    cyclobenzaprine (FLEXERIL) 10 mg tablet Take 1 Tab by mouth three (3) times daily as needed (Pain).  loperamide (IMMODIUM) 2 mg tablet Take 1 Tab by mouth three (3) times daily as needed for Diarrhea for up to 10 days.  DULoxetine (CYMBALTA) 20 mg capsule Take 1 Cap by mouth daily. Indications: Neuropathic Pain    ibuprofen (MOTRIN) 800 mg tablet Take 1 Tab by mouth every eight (8) hours as needed for Pain. Indications: head pain    busPIRone (BUSPAR) 10 mg tablet Take 1 Tab by mouth three (3) times daily.  pregabalin (LYRICA PO) Take  by mouth.  acetaminophen (TYLENOL) 500 mg tablet Take 2 Tabs by mouth three (3) times daily as needed for Pain. Alternate with motrin. No current facility-administered medications for this visit.         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    Depression with anxiety 07/11/2014    Anemia 07/11/2014    Tobacco abuse 07/11/2014       Medical History:     Past Medical History:   Diagnosis Date    ADD (attention deficit disorder)     Anemia     Anxiety     Binge-eating and purging type anorexia nervosa     CMT (Charcot-Anabella-Tooth disease)     Depression        Allergies:      Allergies   Allergen Reactions    Aspirin Hives and Unknown (comments)     headaches    Tramadol Nausea and Vomiting       Surgical History:     Past Surgical History:   Procedure Laterality Date    BREAST SURGERY PROCEDURE UNLISTED      HX GYN      HX ORTHOPAEDIC Right 04/24/2019    major reconstruction of right foot       Social History:     Social History     Socioeconomic History    Marital status:      Spouse name: Not on file    Number of children: Not on file    Years of education: Not on file    Highest education level: Not on file   Tobacco Use    Smoking status: Former Smoker    Smokeless tobacco: Never Used   Substance and Sexual Activity    Alcohol use: Yes     Comment: once in a while    Drug use: No    Sexual activity: Yes     Partners: Male       Review of Symptoms:  Constitutional: c/o malaise, denies fever or chills  Skin: Negative for rash or lesion  Head: Negative for facial swelling or tenderness  Eyes: Negative for redness or discharge  Ears: Negative for otalgia or decreased hearing  Nose: c/o nasal congestion, denies sinus pressure  Neck: c/o sore throat, denies lymphadenopathy   Cardiovascular: Negative for chest pain or palpitations  Respiratory: c/o non-productive cough, denies wheezing or SOB  Gastrointestinal: Negative for nausea or abdominal pain  Neurologic: Negative for headache or dizziness      Visit Vitals  /65 (BP 1 Location: Left arm, BP Patient Position: Sitting)   Pulse 95   Temp 98.4 °F (36.9 °C) (Oral)   Resp 20   Ht 5' 2\" (1.575 m)   Wt 118 lb (53.5 kg)   SpO2 97%   BMI 21.58 kg/m²       Physical Examination:  General: Thin, in no acute distress  Skin: Warm and dry sans rash or lesion  Head: Normocephalic, atraumatic  Eyes: Sclera clear, EOMI  Neck: Normal range of motion, no lymphadenopathy  Cardiovascular: normal S1, S2, regular rate and rhythm  Respiratory: Clear to auscultation bilaterally with symmetrical, unlabored effort  Abdomen: Soft, Normal BS  Extremities: continues to wear post-op boot  Neurologic: Active, alert and oriented      Diagnoses and all orders for this visit:    1. Flu-like symptoms  -     AMB POC RAPID INFLUENZA TEST    2. Diarrhea, unspecified type  -     loperamide (IMMODIUM) 2 mg tablet; Take 1 Tab by mouth three (3) times daily as needed for Diarrhea for up to 10 days.  -     AMB POC RAPID INFLUENZA TEST    3. Cervicalgia  -     cyclobenzaprine (FLEXERIL) 10 mg tablet; Take 1 Tab by mouth three (3) times daily as needed (Pain). 4. CMT (Charcot-Anabella-Tooth disease)    5. Tobacco abuse        Symptomatic therapy suggested: rest, increase fluids, gargle prn for sore throat and call prn if symptoms persist or worsen. Strongly advised patient to stop all use of tobacco products. Advised to follow up with ortho. I have discussed the diagnosis with the patient and the intended plan as seen in the above orders. The patient expresses understanding and agreement with our plan of care. All of the patient's questions were answered to apparent satisfaction. The patient has received an after-visit summary. The patient knows to call our office if there are any questions or concerns regarding diagnosis and treatment plans. I have discussed medication side effects and warnings with the patient as well.

## 2020-06-29 ENCOUNTER — TELEPHONE (OUTPATIENT)
Dept: FAMILY MEDICINE CLINIC | Age: 40
End: 2020-06-29

## 2020-06-29 NOTE — TELEPHONE ENCOUNTER
----- Message from Jose Alberto Navidgio sent at 6/29/2020  9:57 AM EDT -----  Regarding: Johnny/telephone  Pt is requesting an appointment in the office for a right ear ache. She is requesting a Rx for Ibuprerofen,Flexeril she is requesting the name brand for the Flexeril,Cymbalta and Diclofenac. Pts number is [phone] and [employer] number is [phone].

## 2020-06-30 ENCOUNTER — VIRTUAL VISIT (OUTPATIENT)
Dept: FAMILY MEDICINE CLINIC | Age: 40
End: 2020-06-30

## 2020-06-30 DIAGNOSIS — H69.81 EUSTACHIAN TUBE DYSFUNCTION, RIGHT: Primary | ICD-10-CM

## 2020-06-30 DIAGNOSIS — M54.2 CERVICALGIA: ICD-10-CM

## 2020-06-30 DIAGNOSIS — G60.0 CMT (CHARCOT-MARIE-TOOTH DISEASE): ICD-10-CM

## 2020-06-30 RX ORDER — CETIRIZINE HCL 10 MG
10 TABLET ORAL DAILY
Qty: 30 TAB | Refills: 2 | Status: SHIPPED | OUTPATIENT
Start: 2020-06-30 | End: 2020-12-28 | Stop reason: SDUPTHER

## 2020-06-30 RX ORDER — FLUTICASONE PROPIONATE 50 MCG
2 SPRAY, SUSPENSION (ML) NASAL DAILY
Qty: 1 BOTTLE | Refills: 1 | Status: SHIPPED | OUTPATIENT
Start: 2020-06-30 | End: 2021-01-04 | Stop reason: SDUPTHER

## 2020-06-30 RX ORDER — CYCLOBENZAPRINE HCL 10 MG
10 TABLET ORAL
Qty: 90 TAB | Refills: 1 | Status: SHIPPED | OUTPATIENT
Start: 2020-06-30 | End: 2020-06-30

## 2020-06-30 RX ORDER — CYCLOBENZAPRINE HCL 10 MG
10 TABLET ORAL
Qty: 28 TAB | Refills: 0 | Status: SHIPPED | OUTPATIENT
Start: 2020-06-30 | End: 2020-12-28 | Stop reason: SDUPTHER

## 2020-06-30 NOTE — PROGRESS NOTES
[address] Medicine Residency Attending Addendum:  Dr. Susi Hayes MD,  the patient and I were not physically present during this encounter. The resident and I are concurrently monitoring the patient care through appropriate telecommunication technology. I discussed the findings, assessment and plan with the resident and agree with the resident's findings and plan as documented in the resident's note.       Lanre Malik MD

## 2020-06-30 NOTE — PROGRESS NOTES
Cliff Durham  46 y.o. female  1980  [place]  [id number]    [phone] (home)      [address]:    Telephone Encounter  Campos Alejo MD       Encounter Date: 6/30/2020 at 1:18 PM    Consent: Cliff Durham, who was seen by synchronous (real-time) audio only technology, and/or her healthcare decision maker, is aware that this patient-initiated, Telehealth encounter on 6/30/2020 is a billable service, with coverage as determined by her insurance carrier. She is aware that she may receive a bill and has provided verbal consent to proceed: Yes. No chief complaint on file. History of Present Illness   Cliff Durham is a 46 y.o. female was evaluated by telephone. I communicated with the patient and/or health care decision maker about Ear discomfort. R Ear discomfort: For the last 2 weeks the pt has had \"Water in the ears sensation\" in the R ear which is intermittent and worse at night. Her hearing sounds muffled and she has noticed decreased hearing when trying to hold the phone to the R ear. She does also endorse popping sensation at times after which her hearing will improve. The pt does have allergies and while she denies current sinus congestion she does endorse sneezing and rhinorrhea. Charcot-Anabella-Tooth: The pt is almost out of her flexiril she takes for muscle spasms. Migraines: Pt endorses taking Motrin 800mg BID for many months and even years. Sometimes if one dose did not help she would take two at the same time. Lately the migraines have felt like \"pounding head in, throwing me on the ground\". Dizzy and lightheaded. She denies ever being on any migraine preventative medications. Review of Systems   Review of Systems   Constitutional: Negative for chills, fever and malaise/fatigue. HENT: Positive for ear pain and hearing loss. Negative for congestion and sinus pain.     Eyes: Negative for blurred vision and pain. Respiratory: Negative for cough, shortness of breath and wheezing. Cardiovascular: Negative for chest pain, palpitations and leg swelling. Gastrointestinal: Negative for abdominal pain, diarrhea, nausea and vomiting. Genitourinary: Negative for dysuria and frequency. Musculoskeletal: Negative for falls and myalgias. Skin: Negative for itching and rash. Neurological: Positive for headaches. Negative for dizziness and focal weakness. Endo/Heme/Allergies: Negative for polydipsia. Does not bruise/bleed easily. Psychiatric/Behavioral: Negative for depression. The patient does not have insomnia. Vitals/Objective:   General: Patient speaking in complete sentences without effort. Normal speech and cooperative. Due to this being a Virtual Check-in/Telephone evaluation, many elements of the physical examination are unable to be assessed. Assessment and Plan:   Time-based coding, delete if not needed: I spent at least 25 minutes with this established patient, and >50% of the time was spent counseling and/or coordinating care regarding ear pain  Total Time: minutes: 21-30 minutes    1. Eustachian tube dysfunction, right- Known allergies likely contributing to this acute ETD. Has not used anti-allergy medications before. Instructed on proper use  - fluticasone propionate (FLONASE) 50 mcg/actuation nasal spray; 2 Sprays by Both Nostrils route daily. Dispense: 1 Bottle; Refill: 1  - cetirizine (ZYRTEC) 10 mg tablet; Take 1 Tab by mouth daily. Dispense: 30 Tab; Refill: 2    2. CMT (Charcot-Anabella-Tooth disease)- Refilled short term flexiril, will need further follow up with Dr. Nayana Ramirez for additional course  - cyclobenzaprine (FLEXERIL) 10 mg tablet; Take 1 Tab by mouth three (3) times daily as needed for Muscle Spasm(s) (Pain). Dispense: 28 Tab; Refill: 0    3. Migraines: PT requests refill for Ibuprofen 800mg. The pt has been taking these chronically, at times more than one at a time. No kidney function in many yrs. Will need kidney testing before safe to continue. Also would benefit from migraine preventative to decrease chronic NSAID use and rebound analgesic headache        We discussed the expected course, resolution and complications of the diagnosis(es) in detail. Medication risks, benefits, costs, interactions, and alternatives were discussed as indicated. I advised her to contact the office if her condition worsens, changes or fails to improve as anticipated. She expressed understanding with the diagnosis(es) and plan. Patient understands that this encounter was a temporary measure, and the importance of further follow up and examination was emphasized. Patient verbalized understanding. Patient informed to follow up: 2 wks    I affirm this is a Patient Initiated Episode with an Established Patient who has not had a related appointment within my department in the past 7 days or scheduled within the next 24 hours. Note: not billable if this call serves to triage the patient into an appointment for the relevant concern      Electronically Signed: Viry Sanders MD  Providers location when delivering service: Home    CPT:  22686 (5-10 minutes)  16315 (11-20 minutes)  21  (21-30 minutes)    Medicare:  [address]      ICD-10-CM ICD-9-CM    1. Eustachian tube dysfunction, right H69.81 381.81 fluticasone propionate (FLONASE) 50 mcg/actuation nasal spray      cetirizine (ZYRTEC) 10 mg tablet   2. CMT (Charcot-Anabella-Tooth disease) G60.0 356.1    3.  Cervicalgia M54.2 723.1 cyclobenzaprine (FLEXERIL) 10 mg tablet      DISCONTINUED: cyclobenzaprine (FLEXERIL) 10 mg tablet       Pursuant to the emergency declaration under the [facility] waiver authority and the [employer] and [employer] Act, this Virtual  Visit was conducted, with patient's consent, to reduce the patient's risk of exposure to COVID-19 and provide continuity of care for an established patient. History   Patients past medical, surgical and family histories were personally reviewed and updated.       Past Medical History:   Diagnosis Date    ADD (attention deficit disorder)     Anemia     Anxiety     Binge-eating and purging type anorexia nervosa     CMT (Charcot-Anabella-Tooth disease)     Depression      Past Surgical History:   Procedure Laterality Date    BREAST SURGERY PROCEDURE UNLISTED      HX GYN      HX ORTHOPAEDIC Right 04/24/2019    major reconstruction of right foot     Family History   Problem Relation Age of Onset    Cancer Maternal Grandmother     Cancer Paternal Grandmother     Diabetes Paternal Grandmother      Social History     Socioeconomic History    Marital status:      Spouse name: Not on file    Number of children: Not on file    Years of education: Not on file    Highest education level: Not on file   Occupational History    Not on file   Social Needs    Financial resource strain: Not on file    Food insecurity     Worry: Not on file     Inability: Not on file    Transportation needs     Medical: Not on file     Non-medical: Not on file   Tobacco Use    Smoking status: Former Smoker    Smokeless tobacco: Never Used   Substance and Sexual Activity    Alcohol use: Yes     Comment: once in a while    Drug use: No    Sexual activity: Yes     Partners: Male   Lifestyle    Physical activity     Days per week: Not on file     Minutes per session: Not on file    Stress: Not on file   Relationships    Social connections     Talks on phone: Not on file     Gets together: Not on file     Attends Taoist service: Not on file     Active member of club or organization: Not on file     Attends meetings of clubs or organizations: Not on file     Relationship status: Not on file    Intimate partner violence     Fear of current or ex partner: Not on file     Emotionally abused: Not on file Physically abused: Not on file     Forced sexual activity: Not on file   Other Topics Concern    Not on file   Social History Narrative    Not on file            Current Medications/Allergies   Medications and Allergies reviewed:    Current Outpatient Medications   Medication Sig Dispense Refill    fluticasone propionate (FLONASE) 50 mcg/actuation nasal spray 2 Sprays by Both Nostrils route daily. 1 Bottle 1    cetirizine (ZYRTEC) 10 mg tablet Take 1 Tab by mouth daily. 30 Tab 2    cyclobenzaprine (FLEXERIL) 10 mg tablet Take 1 Tab by mouth three (3) times daily as needed for Muscle Spasm(s) (Pain). 28 Tab 0    DULoxetine (CYMBALTA) 20 mg capsule Take 1 Cap by mouth daily. Indications: Neuropathic Pain 30 Cap 2    ibuprofen (MOTRIN) 800 mg tablet Take 1 Tab by mouth every eight (8) hours as needed for Pain. Indications: head pain 30 Tab 2    busPIRone (BUSPAR) 10 mg tablet Take 1 Tab by mouth three (3) times daily. 90 Tab 0    pregabalin (LYRICA PO) Take  by mouth.  acetaminophen (TYLENOL) 500 mg tablet Take 2 Tabs by mouth three (3) times daily as needed for Pain. Alternate with motrin.  60 Tab 0     Allergies   Allergen Reactions    Aspirin Hives and Unknown (comments)     headaches    Tramadol Nausea and Vomiting

## 2020-07-27 NOTE — TELEPHONE ENCOUNTER
Attempted to call. No answer. Voicemail box not set up. Bottom number of b/p probably error of machine. Per Dr. Becky Jose:  Patient with hx of low BP it was first recorded as 85/59 on 7/8/2014. Patient requested hydrocodone for pain which she has been taking regularly. Side effects of hydrocodone include lightheadedness, dizziness, drowsiness and sedation.  Recommend she decrease frequency of hydrocodone and monitor these symptoms after she has stopped the hydrocodone Cheek Interpolation Flap Text: A decision was made to reconstruct the defect utilizing an interpolation axial flap and a staged reconstruction.  A telfa template was made of the defect.  This telfa template was then used to outline the Cheek Interpolation flap.  The donor area for the pedicle flap was then injected with anesthesia.  The flap was excised through the skin and subcutaneous tissue down to the layer of the underlying musculature.  The interpolation flap was carefully excised within this deep plane to maintain its blood supply.  The edges of the donor site were undermined.   The donor site was closed in a primary fashion.  The pedicle was then rotated into position and sutured.  Once the tube was sutured into place, adequate blood supply was confirmed with blanching and refill.  The pedicle was then wrapped with xeroform gauze and dressed appropriately with a telfa and gauze bandage to ensure continued blood supply and protect the attached pedicle.

## 2020-12-28 DIAGNOSIS — M54.2 CERVICALGIA: ICD-10-CM

## 2020-12-28 DIAGNOSIS — H69.81 EUSTACHIAN TUBE DYSFUNCTION, RIGHT: ICD-10-CM

## 2020-12-28 DIAGNOSIS — N61.1 ABSCESS OF BREAST, RIGHT: ICD-10-CM

## 2020-12-28 DIAGNOSIS — G60.0 CMT (CHARCOT-MARIE-TOOTH DISEASE): ICD-10-CM

## 2020-12-28 DIAGNOSIS — F41.8 DEPRESSION WITH ANXIETY: ICD-10-CM

## 2020-12-29 RX ORDER — CYCLOBENZAPRINE HCL 10 MG
10 TABLET ORAL
Qty: 28 TAB | Refills: 0 | Status: SHIPPED | OUTPATIENT
Start: 2020-12-29 | End: 2021-04-13 | Stop reason: SDUPTHER

## 2020-12-29 RX ORDER — CETIRIZINE HCL 10 MG
10 TABLET ORAL DAILY
Qty: 30 TAB | Refills: 2 | Status: SHIPPED | OUTPATIENT
Start: 2020-12-29

## 2020-12-29 RX ORDER — DULOXETIN HYDROCHLORIDE 20 MG/1
20 CAPSULE, DELAYED RELEASE ORAL DAILY
Qty: 30 CAP | Refills: 2 | Status: SHIPPED | OUTPATIENT
Start: 2020-12-29 | End: 2021-03-30

## 2020-12-29 RX ORDER — BUSPIRONE HYDROCHLORIDE 10 MG/1
10 TABLET ORAL 3 TIMES DAILY
Qty: 90 TAB | Refills: 0 | Status: SHIPPED | OUTPATIENT
Start: 2020-12-29 | End: 2022-07-07

## 2020-12-29 RX ORDER — IBUPROFEN 800 MG/1
800 TABLET ORAL
Qty: 30 TAB | Refills: 2 | OUTPATIENT
Start: 2020-12-29

## 2020-12-29 NOTE — TELEPHONE ENCOUNTER
Refilled meds except high dose ibuprofen. There are no kidney labs in over a yr. She would need this to safely continue taking.  Would recommend discussing alternatives with PCP as many side effects from taking high dose NSAIDs chronically

## 2020-12-29 NOTE — TELEPHONE ENCOUNTER
Call made to pt. Pt made aware of Dr. Elmo Aguirre message. Pt verbalized understanding and an appt was scheduled.

## 2021-01-04 ENCOUNTER — VIRTUAL VISIT (OUTPATIENT)
Dept: FAMILY MEDICINE CLINIC | Age: 41
End: 2021-01-04
Payer: MEDICAID

## 2021-01-04 ENCOUNTER — TELEPHONE (OUTPATIENT)
Dept: FAMILY MEDICINE CLINIC | Age: 41
End: 2021-01-04

## 2021-01-04 DIAGNOSIS — J00 ACUTE RHINITIS: ICD-10-CM

## 2021-01-04 DIAGNOSIS — Z72.0 TOBACCO ABUSE: ICD-10-CM

## 2021-01-04 DIAGNOSIS — J03.90 PHARYNGOTONSILLITIS: Primary | ICD-10-CM

## 2021-01-04 DIAGNOSIS — J02.9 PHARYNGOTONSILLITIS: Primary | ICD-10-CM

## 2021-01-04 DIAGNOSIS — R51.9 NONINTRACTABLE EPISODIC HEADACHE, UNSPECIFIED HEADACHE TYPE: ICD-10-CM

## 2021-01-04 PROCEDURE — 99442 PR PHYS/QHP TELEPHONE EVALUATION 11-20 MIN: CPT | Performed by: FAMILY MEDICINE

## 2021-01-04 RX ORDER — FLUTICASONE PROPIONATE 50 MCG
2 SPRAY, SUSPENSION (ML) NASAL DAILY
Qty: 1 BOTTLE | Refills: 2 | Status: SHIPPED | OUTPATIENT
Start: 2021-01-04 | End: 2022-07-07 | Stop reason: SDUPTHER

## 2021-01-04 RX ORDER — AMOXICILLIN 875 MG/1
875 TABLET, FILM COATED ORAL 2 TIMES DAILY
Qty: 20 TAB | Refills: 0 | Status: SHIPPED | OUTPATIENT
Start: 2021-01-04 | End: 2021-01-14

## 2021-01-04 RX ORDER — BUTALBITAL, ACETAMINOPHEN AND CAFFEINE 50; 325; 40 MG/1; MG/1; MG/1
1 TABLET ORAL
Qty: 30 TAB | Refills: 0 | Status: SHIPPED | OUTPATIENT
Start: 2021-01-04

## 2021-01-04 NOTE — PROGRESS NOTES
1. Have you been to the ER, urgent care clinic since your last visit? Hospitalized since your last visit? No    2. Have you seen or consulted any other health care providers outside of the [address] [place] [address] since your last visit? Include any pap smears or colon screening.  No      Health Maintenance Due   Topic Date Due    DTaP/Tdap/Td series (1 - Tdap) 10/13/2002    PAP AKA CERVICAL CYTOLOGY  03/19/2017    Flu Vaccine (1) 09/01/2020

## 2021-01-04 NOTE — TELEPHONE ENCOUNTER
Patient called office. requested for Dr. Ellie Hunt to prescribe lyrica that was previously prescribed to her by specialist. Cruz Cosme her that Dr. Ellie Hunt would have to review and decide. She verbalized understanding.

## 2021-01-04 NOTE — PROGRESS NOTES
Mita Johnston is a 45 y.o. female evaluated via telephone on 21. Patient Identity confirmed by . Telephone encounter done in lieu of office visit due to extraordinary circumstances. A state of national and state emergency has been declared by the President and the [place] due to the [employer] pandemic. Pursuant to the emergency declaration under the [facility] [address] authority and the [employer] and [employer] Act, this Virtual  Visit was conducted, with patient's consent, to reduce the patient's risk of exposure to COVID-19 and provide continuity of care for an established patient. Luigi Rincon LPN coordinated virtual visit    Consent:  Patient and/or health care decision maker is aware that he/she may receive a bill for this telephone encounter, depending on his insurance coverage, and has provided verbal consent to proceed: Yes    Physician Location: Office  Patient Location: Home    CC: ST/HA  Information gathered from patient and/or health care decision maker. HPI: Mita Johnston is a 45 y.o. female who was evaluated by synchronous (real-time) audio technology from his/her home. Patient complains of HA and ST. Patient denies F/C, otalgia, dizziness, SOB, CP, abdominal pain, dysuria, acute myalgias or arthralgias. There is a hx of allergic rhinitis and current tobacco abuse. Encounter Diagnoses   Name Primary?  Pharyngotonsillitis Yes    Nonintractable episodic headache, unspecified headache type     Acute rhinitis     Tobacco abuse          Current Outpatient Medications:     fluticasone propionate (FLONASE) 50 mcg/actuation nasal spray, 2 Sprays by Both Nostrils route daily. , Disp: 1 Bottle, Rfl: 2    butalbital-acetaminophen-caffeine (FIORICET, ESGIC) -40 mg per tablet, Take 1 Tab by mouth every six (6) hours as needed for Headache., Disp: 30 Tab, Rfl: 0    amoxicillin (AMOXIL) 875 mg tablet, Take 1 Tab by mouth two (2) times a day for 10 days. , Disp: 20 Tab, Rfl: 0    DULoxetine (CYMBALTA) 20 mg capsule, Take 1 Cap by mouth daily. Indications: neuropathic pain, Disp: 30 Cap, Rfl: 2    cyclobenzaprine (FLEXERIL) 10 mg tablet, Take 1 Tab by mouth three (3) times daily as needed for Muscle Spasm(s) (Pain). , Disp: 28 Tab, Rfl: 0    busPIRone (BUSPAR) 10 mg tablet, Take 1 Tab by mouth three (3) times daily. , Disp: 90 Tab, Rfl: 0    cetirizine (ZYRTEC) 10 mg tablet, Take 1 Tab by mouth daily. , Disp: 30 Tab, Rfl: 2    ibuprofen (MOTRIN) 800 mg tablet, Take 1 Tab by mouth every eight (8) hours as needed for Pain. 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    Depression with anxiety 07/11/2014    Anemia 07/11/2014    Tobacco abuse 07/11/2014        Review of Systems:  Constitutional: Negative for fatigue, malaise  Resp: Negative for cough, wheezing or SOB  CV: Negative for chest pain, dizziness or palpitations  GI: Negative for nausea or abdominal pain  MS: Positive for arthralgias   Neuro: Negative for HA, weakness or paresthesia  Psych: Hx of depression and anxiety       Assessment/Plan:  Details of this discussion including any medical advice provided: Patient advised as a precaution to stay at home, practice regular hand washing with soap and warm water and to wear a mask and utilize social distancing when necessary to be out in public places. ICD-10-CM ICD-9-CM    1. Pharyngotonsillitis  J03.90 465.8 amoxicillin (AMOXIL) 875 mg tablet    J02.9     2. Nonintractable episodic headache, unspecified headache type  R51.9 784.0 butalbital-acetaminophen-caffeine (FIORICET, ESGIC) -40 mg per tablet   3. Acute rhinitis  J00 460 fluticasone propionate (FLONASE) 50 mcg/actuation nasal spray   4. Tobacco abuse  Z72.0 305.1      Strongly advised patient to stop all use of tobacco products. Symptomatic therapy suggested: rest, stay well hydrated, salt water gargles, call prn if symptoms persist or worsen. Total Time: minutes: 11-20 minutes was spent addressing above problems and plan. Patient medical history, prior OV notes, vitals flow sheet, lab results, medications, and allergies were reviewed during this encounter. All of the patient's questions were addressed and answered to apparent satisfaction. The patient understands and agrees with our plan of care. The patient knows to call back if they have questions about the plan of care or if symptoms change. For phone encounters:  I affirm this is a patient initiated episode with an established Patient who has not had a related appointment within my department in the past 7 days or scheduled within the next 24 hours. Note: not billable if this call serves to triage the patient into an appointment for the relevant concern    Follow-up and Dispositions    · Return if symptoms worsen or fail to improve.          MD JAJA Tran & KALYN TIJERINA [facility]  01/04/21

## 2021-01-04 NOTE — TELEPHONE ENCOUNTER
Pt states that the headache medication that Dr Christopher Pham had prescribed did not get to [facility].

## 2021-01-05 NOTE — TELEPHONE ENCOUNTER
show this has not been prescribed in over a year. As this is a controlled medication, I will not be able to restart her on lyrica.

## 2021-01-05 NOTE — TELEPHONE ENCOUNTER
Returned call. Advised patient of msg from Dr. Turner. She verbalized understanding. She states she will schedule appt with orthopedist.

## 2021-03-30 ENCOUNTER — VIRTUAL VISIT (OUTPATIENT)
Dept: FAMILY MEDICINE CLINIC | Age: 41
End: 2021-03-30
Payer: MEDICAID

## 2021-03-30 DIAGNOSIS — J02.9 PHARYNGOTONSILLITIS: Primary | ICD-10-CM

## 2021-03-30 DIAGNOSIS — J03.90 PHARYNGOTONSILLITIS: Primary | ICD-10-CM

## 2021-03-30 PROCEDURE — 99441 PR PHYS/QHP TELEPHONE EVALUATION 5-10 MIN: CPT | Performed by: FAMILY MEDICINE

## 2021-03-30 RX ORDER — PREDNISONE 10 MG/1
10 TABLET ORAL 2 TIMES DAILY
Qty: 10 TAB | Refills: 0 | Status: SHIPPED | OUTPATIENT
Start: 2021-03-30 | End: 2021-04-04

## 2021-03-30 RX ORDER — PREGABALIN 200 MG/1
200 CAPSULE ORAL 2 TIMES DAILY
COMMUNITY
End: 2022-07-07

## 2021-03-30 RX ORDER — AMOXICILLIN AND CLAVULANATE POTASSIUM 875; 125 MG/1; MG/1
1 TABLET, FILM COATED ORAL EVERY 12 HOURS
Qty: 14 TAB | Refills: 0 | Status: SHIPPED | OUTPATIENT
Start: 2021-03-30 | End: 2021-04-06

## 2021-03-30 NOTE — PROGRESS NOTES
1. Have you been to the ER, urgent care clinic since your last visit? Hospitalized since your last visit? Yes, 3- [facility] ER, laryngitis    2. Have you seen or consulted any other health care providers outside of the [address] [place] [address] since your last visit? Include any pap smears or colon screening. No    Reviewed record in preparation for visit and have necessary documentation  Goals that were addressed and/or need to be completed during or after this appointment include     Health Maintenance Due   Topic Date Due    Hepatitis C Screening  Never done    DTaP/Tdap/Td series (1 - Tdap) Never done    PAP AKA CERVICAL CYTOLOGY  03/19/2017    Flu Vaccine (1) 09/01/2020       Patient is accompanied by self I have received verbal consent from Michael Lord to discuss any/all medical information while they are present in the room.

## 2021-03-30 NOTE — PROGRESS NOTES
Patsy Stewart is a 45 y.o. female evaluated via telephone on 21. Patient Identity confirmed by . Telephone encounter done in lieu of office visit due to extraordinary circumstances. A state of national and state emergency has been declared by the President and the [place] due to the [employer] pandemic. Pursuant to the emergency declaration under the [facility] [address] authority and the [employer] and [employer] Act, this Virtual  Visit was conducted, with patient's consent, to reduce the patient's risk of exposure to COVID-19 and provide continuity of care for an established patient. Zaynab Darnell LPN coordinated virtual visit    Consent:  Patient and/or health care decision maker is aware that he/she may receive a bill for this telephone encounter, depending on his insurance coverage, and has provided verbal consent to proceed: Yes    Physician Location: Office  Patient Location: Home    CC: URI  Information gathered from patient and/or health care decision maker. HPI: Patsy Stewart is a 45 y.o. female who was evaluated by synchronous (real-time) audio technology from his/her home. Patient complains of HA, ST congestion and cough. Patient denies F/C, otalgia, dizziness, SOB, CP, abdominal pain, dysuria, acute myalgias or arthralgias. There is a hx of allergic rhinitis and current tobacco abuse. Patient's  with similar symptoms. Encounter Diagnoses   Name Primary?  Pharyngotonsillitis Yes         Current Outpatient Medications:     pregabalin (Lyrica) 200 mg capsule, Take 200 mg by mouth two (2) times a day., Disp: , Rfl:     amoxicillin-clavulanate (AUGMENTIN) 875-125 mg per tablet, Take 1 Tab by mouth every twelve (12) hours for 7 days. , Disp: 14 Tab, Rfl: 0    predniSONE (DELTASONE) 10 mg tablet, Take 10 mg by mouth two (2) times a day for 5 days. , Disp: 10 Tab, Rfl: 0   fluticasone propionate (FLONASE) 50 mcg/actuation nasal spray, 2 Sprays by Both Nostrils route daily. , Disp: 1 Bottle, Rfl: 2    butalbital-acetaminophen-caffeine (FIORICET, ESGIC) -40 mg per tablet, Take 1 Tab by mouth every six (6) hours as needed for Headache., Disp: 30 Tab, Rfl: 0    cyclobenzaprine (FLEXERIL) 10 mg tablet, Take 1 Tab by mouth three (3) times daily as needed for Muscle Spasm(s) (Pain). , Disp: 28 Tab, Rfl: 0    busPIRone (BUSPAR) 10 mg tablet, Take 1 Tab by mouth three (3) times daily. , Disp: 90 Tab, Rfl: 0    cetirizine (ZYRTEC) 10 mg tablet, Take 1 Tab by mouth daily. 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    Depression with anxiety 07/11/2014    Anemia 07/11/2014    Tobacco abuse 07/11/2014        Review of Systems:  Constitutional: Negative for fatigue, malaise  Resp: Negative for wheezing or SOB  CV: Negative for chest pain, dizziness or palpitations  GI: Negative for nausea or abdominal pain  MS: Positive for arthralgias   Neuro: Negative for HA, weakness or paresthesia  Psych: Hx of depression and anxiety       Assessment/Plan:  Details of this discussion including any medical advice provided: Patient advised as a precaution to stay at home, practice regular hand washing with soap and warm water and to wear a mask and utilize social distancing when necessary to be out in public places. ICD-10-CM ICD-9-CM    1. Pharyngotonsillitis  J03.90 465.8 amoxicillin-clavulanate (AUGMENTIN) 875-125 mg per tablet    J02.9  predniSONE (DELTASONE) 10 mg tablet       Symptomatic therapy suggested: rest, stay well hydrated, salt water gargles, call prn if symptoms persist or worsen.     Total Time: minutes: 5-10 minutes was spent addressing above problems and plan. Patient medical history, prior OV notes, vitals flow sheet, lab results, medications, and allergies were reviewed during this encounter. All of the patient's questions were addressed and answered to apparent satisfaction. The patient understands and agrees with our plan of care. The patient knows to call back if they have questions about the plan of care or if symptoms change. For phone encounters:  I affirm this is a patient initiated episode with an established Patient who has not had a related appointment within my department in the past 7 days or scheduled within the next 24 hours.     Note: not billable if this call serves to triage the patient into an appointment for the relevant concern        MD JAJA Lovett & KALYN TIJERINA [facility]  03/30/21

## 2021-04-13 ENCOUNTER — VIRTUAL VISIT (OUTPATIENT)
Dept: FAMILY MEDICINE CLINIC | Age: 41
End: 2021-04-13
Payer: MEDICAID

## 2021-04-13 DIAGNOSIS — M54.2 CERVICALGIA: ICD-10-CM

## 2021-04-13 DIAGNOSIS — N89.8 VAGINAL ITCHING: Primary | ICD-10-CM

## 2021-04-13 PROCEDURE — 99441 PR PHYS/QHP TELEPHONE EVALUATION 5-10 MIN: CPT | Performed by: STUDENT IN AN ORGANIZED HEALTH CARE EDUCATION/TRAINING PROGRAM

## 2021-04-13 RX ORDER — FLUCONAZOLE 150 MG/1
150 TABLET ORAL DAILY
Qty: 1 TAB | Refills: 0 | Status: SHIPPED | OUTPATIENT
Start: 2021-04-13 | End: 2021-04-14

## 2021-04-13 RX ORDER — CYCLOBENZAPRINE HCL 10 MG
10 TABLET ORAL
Qty: 28 TAB | Refills: 0 | Status: SHIPPED | OUTPATIENT
Start: 2021-04-13 | End: 2021-06-14 | Stop reason: SDUPTHER

## 2021-04-13 NOTE — PROGRESS NOTES
1. Have you been to the ER, urgent care clinic since your last visit? Hospitalized since your last visit? Yes When: no     2. Have you seen or consulted any other health care providers outside of the [address] [place] [address] since your last visit? Include any pap smears or colon screening.  No        Health Maintenance Due   Topic Date Due    Hepatitis C Screening  Never done    DTaP/Tdap/Td series (1 - Tdap) Never done    PAP AKA CERVICAL CYTOLOGY  03/19/2017

## 2021-04-13 NOTE — PROGRESS NOTES
Zulema Morgan  45 y.o. female  1980  [place]  [id number]    [phone] (home)      [address]:    Telephone Encounter  Idalia Parra MD       Encounter Date: 4/13/2021 at 9:16 AM    Consent: Zulema Morgan, who was seen by synchronous (real-time) audio only technology, and/or her healthcare decision maker, is aware that this patient-initiated, Telehealth encounter on 4/13/2021 is a billable service, with coverage as determined by her insurance carrier. She is aware that she may receive a bill and has provided verbal consent to proceed: Yes. Chief Complaint   Patient presents with    Vaginal Itching       History of Present Illness   Zulema Morgan is a 45 y.o. female was evaluated by telephone. I communicated with the patient and/or health care decision maker about vaginal itching. Pt reports that 10 days ago, she started having itching and burning of her labia minora and vagina since using the tampon. She feels that she may be allergic to the tampon. She used 3 in the pack before feeling it was coming from this. She is not having any vaginal discharge, odor, noticeable rash or erythema. She recently was treated with augmentin and prednisone for URI. She denies fevers, chills, N/V, abdominal pain. She denies chance of pregnancy. Review of Systems   Review of Systems   Constitutional: Negative for chills and fever. Gastrointestinal: Negative for abdominal pain, nausea and vomiting. Genitourinary: Negative for dysuria, flank pain, frequency, hematuria and urgency. No vaginal discharge, odor, bleeding   Skin: Positive for itching. Negative for rash. Vitals/Objective:   General: Patient speaking in complete sentences without effort. Normal speech and cooperative. Due to this being a Virtual Check-in/Telephone evaluation, many elements of the physical examination are unable to be assessed.     Assessment and Plan: Total Time: minutes: 5-10 minutes    1. Vaginal itching - sx consistent w/ external candidal infection of vulva with some distal vaginal involvement. No fever, chills. Denies pregnancy possibility. LMP 10 days ago. - Diflucan 150 mg for one dose  - Pt can try concurrent topical clotrimazole or miconazole to help with symptoms as well  - If no improvement with therapy, will need to come in for further testing      Patient informed to follow up: PRN    I affirm this is a Patient Initiated Episode with an Established Patient who has not had a related appointment within my department in the past 7 days or scheduled within the next 24 hours. Note: not billable if this call serves to triage the patient into an appointment for the relevant concern      Electronically Signed: Suzanna Hdz MD  Providers location when delivering service: home        ICD-10-CM ICD-9-CM    1. Vaginal itching  N89.8 698.1    2. Cervicalgia  M54.2 723.1 cyclobenzaprine (FLEXERIL) 10 mg tablet       Pursuant to the emergency declaration under the [facility] waiver authority and the [employer] Act, this Virtual  Visit was conducted, with patient's consent, to reduce the patient's risk of exposure to COVID-19 and provide continuity of care for an established patient. History   Patients past medical, surgical and family histories were personally reviewed and updated.       Past Medical History:   Diagnosis Date    ADD (attention deficit disorder)     Anemia     Anxiety     Binge-eating and purging type anorexia nervosa     CMT (Charcot-Anabella-Tooth disease)     Depression      Past Surgical History:   Procedure Laterality Date    HX GYN      HX ORTHOPAEDIC Right 04/24/2019    major reconstruction of right foot    [place] BREAST SURGERY PROCEDURE UNLISTED       Family History   Problem Relation Age of Onset    Cancer Maternal Grandmother     Cancer Paternal Grandmother     Diabetes Paternal Grandmother      Social History     Tobacco Use    Smoking status: Former Smoker    Smokeless tobacco: Never Used   Substance Use Topics    Alcohol use: Yes     Comment: once in a while    Drug use: No              Current Medications/Allergies   Medications and Allergies reviewed:    Current Outpatient Medications   Medication Sig Dispense Refill    cyclobenzaprine (FLEXERIL) 10 mg tablet Take 1 Tab by mouth three (3) times daily as needed for Muscle Spasm(s) (Pain). 28 Tab 0    fluconazole (DIFLUCAN) 150 mg tablet Take 1 Tab by mouth daily for 1 day. FDA advises cautious prescribing of oral fluconazole in pregnancy. 1 Tab 0    pregabalin (Lyrica) 200 mg capsule Take 200 mg by mouth two (2) times a day.  fluticasone propionate (FLONASE) 50 mcg/actuation nasal spray 2 Sprays by Both Nostrils route daily. 1 Bottle 2    butalbital-acetaminophen-caffeine (FIORICET, ESGIC) -40 mg per tablet Take 1 Tab by mouth every six (6) hours as needed for Headache. 30 Tab 0    busPIRone (BUSPAR) 10 mg tablet Take 1 Tab by mouth three (3) times daily. 90 Tab 0    cetirizine (ZYRTEC) 10 mg tablet Take 1 Tab by mouth daily.  30 Tab 2     Allergies   Allergen Reactions    Aspirin Hives and Unknown (comments)     headaches    Tramadol Nausea and Vomiting

## 2021-04-23 NOTE — PROGRESS NOTES
[address] Medicine Residency Attending Addendum:  Dr. Margy Luu MD,  the patient and I were not physically present during this encounter. The resident and I are concurrently monitoring the patient care through appropriate telecommunication technology. I discussed the findings, assessment and plan with the resident and agree with the resident's findings and plan as documented in the resident's note.       Aliza Cry MD

## 2021-06-14 ENCOUNTER — TRANSCRIBE ORDER (OUTPATIENT)
Dept: NEUROLOGY | Age: 41
End: 2021-06-14

## 2021-06-14 DIAGNOSIS — M54.2 CERVICALGIA: ICD-10-CM

## 2021-06-14 NOTE — TELEPHONE ENCOUNTER
----- Message from Kimberly Bhatt sent at 6/14/2021  7:37 AM EDT -----  Regarding: DG/REFILL  Contact: [phone]  Medication Refill    Caller (if not patient):N/A      Relationship of caller (if not patient): N/A      Best contact number(s): [phone]      Name of medication and dosage if known: Flexeril 10 mg      Is patient out of this medication (yes/no): Yes      Pharmacy name: [facility] ([facility])0-0p.   10    Pharmacy listed in chart? (yes/no): Yes    Pharmacy phone number: [phone]      Details to clarify the request: Refill request for Flexeril 10 mg      Kimberly Bhatt

## 2021-06-16 RX ORDER — CYCLOBENZAPRINE HCL 10 MG
10 TABLET ORAL
Qty: 28 TABLET | Refills: 0 | Status: SHIPPED | OUTPATIENT
Start: 2021-06-16 | End: 2021-07-22

## 2022-03-10 ENCOUNTER — OFFICE VISIT (OUTPATIENT)
Dept: FAMILY MEDICINE CLINIC | Age: 42
End: 2022-03-10
Payer: MEDICAID

## 2022-03-10 VITALS
BODY MASS INDEX: 19.62 KG/M2 | RESPIRATION RATE: 18 BRPM | OXYGEN SATURATION: 96 % | SYSTOLIC BLOOD PRESSURE: 93 MMHG | HEIGHT: 62 IN | WEIGHT: 106.6 LBS | DIASTOLIC BLOOD PRESSURE: 58 MMHG | HEART RATE: 100 BPM | TEMPERATURE: 99.8 F

## 2022-03-10 DIAGNOSIS — R05.8 DRY COUGH: ICD-10-CM

## 2022-03-10 DIAGNOSIS — J02.9 SORE THROAT: Primary | ICD-10-CM

## 2022-03-10 LAB
QUICKVUE INFLUENZA TEST: NEGATIVE
S PYO AG THROAT QL: NEGATIVE
VALID INTERNAL CONTROL?: YES
VALID INTERNAL CONTROL?: YES

## 2022-03-10 PROCEDURE — 99213 OFFICE O/P EST LOW 20 MIN: CPT | Performed by: STUDENT IN AN ORGANIZED HEALTH CARE EDUCATION/TRAINING PROGRAM

## 2022-03-10 PROCEDURE — 87804 INFLUENZA ASSAY W/OPTIC: CPT | Performed by: STUDENT IN AN ORGANIZED HEALTH CARE EDUCATION/TRAINING PROGRAM

## 2022-03-10 PROCEDURE — 87880 STREP A ASSAY W/OPTIC: CPT | Performed by: STUDENT IN AN ORGANIZED HEALTH CARE EDUCATION/TRAINING PROGRAM

## 2022-03-10 RX ORDER — VENLAFAXINE HYDROCHLORIDE 75 MG/1
75 CAPSULE, EXTENDED RELEASE ORAL DAILY
COMMUNITY
Start: 2022-03-05

## 2022-03-10 RX ORDER — TOPIRAMATE 100 MG/1
100 TABLET, FILM COATED ORAL 2 TIMES DAILY
COMMUNITY
Start: 2022-01-28

## 2022-03-10 RX ORDER — TIZANIDINE 4 MG/1
TABLET ORAL
COMMUNITY
Start: 2021-12-28 | End: 2022-07-07

## 2022-03-10 RX ORDER — BENZOCAINE AND MENTHOL, UNSPECIFIED FORM 15; 20 MG/1; MG/1
1 LOZENGE ORAL AS NEEDED
Qty: 16 EACH | Refills: 0 | Status: SHIPPED | OUTPATIENT
Start: 2022-03-10 | End: 2022-07-07

## 2022-03-10 RX ORDER — NAPROXEN 500 MG/1
500 TABLET ORAL 2 TIMES DAILY WITH MEALS
COMMUNITY
Start: 2021-08-11 | End: 2022-07-07

## 2022-03-10 NOTE — PROGRESS NOTES
[facility] [address] [facility], [facility] 24  P ([phone])  Date of visit:  3/10/2022    Lars Wheat is an 37 y.o. female presents to the clinic due to sore throat and dry cough. Symtom: sore throat, and dry cough  Onset: a couple of days ago  Sick contact: positive. . Similar symptoms   Covid vaccination: No  Medication tried: None   Hx of tonsillitis and strep  Negative: chills, nausea, vomiting, shortness of breath, wheezing, abdominal pain, changes in bowel habit. No covid contact. Allergies   Allergies   Allergen Reactions    Aspirin Hives and Unknown (comments)     headaches    Tramadol Nausea and Vomiting       Medications  Current Outpatient Medications   Medication Sig    tiZANidine (ZANAFLEX) 4 mg tablet TAKE 1 TABLET BY MOUTH EVERY 8 HOURS    naproxen (NAPROSYN) 500 mg tablet Take 500 mg by mouth two (2) times daily (with meals).  topiramate (TOPAMAX) 100 mg tablet Take 100 mg by mouth two (2) times a day.  venlafaxine-SR (EFFEXOR-XR) 75 mg capsule Take 75 mg by mouth daily.  benzocaine-menthoL (Cepacol Instamax Sore Throat) 15-20 mg lozg 1 Lozenge by Mucous Membrane route as needed (Sore throat).  fluticasone propionate (FLONASE) 50 mcg/actuation nasal spray 2 Sprays by Both Nostrils route daily.  butalbital-acetaminophen-caffeine (FIORICET, ESGIC) -40 mg per tablet Take 1 Tab by mouth every six (6) hours as needed for Headache.  busPIRone (BUSPAR) 10 mg tablet Take 1 Tab by mouth three (3) times daily.  cetirizine (ZYRTEC) 10 mg tablet Take 1 Tab by mouth daily.  cyclobenzaprine (FLEXERIL) 10 mg tablet TAKE 1 TABLET BY MOUTH THREE TIMES DAILY AS NEEDED FOR MUSCLE SPASM PAIN (Patient not taking: Reported on 3/10/2022)    pregabalin (Lyrica) 200 mg capsule Take 200 mg by mouth two (2) times a day. (Patient not taking: Reported on 3/10/2022)     No current facility-administered medications for this visit. Medical History  Past Medical History:   Diagnosis Date    ADD (attention deficit disorder)     Anemia     Anxiety     Binge-eating and purging type anorexia nervosa     CMT (Charcot-Anabella-Tooth disease)     Depression        Immunizations   Immunization History   Administered Date(s) Administered    Influenza Vaccine Tantaline) PF (>6 Mo Flulaval, Fluarix, and >3 [employer] 47[address]) 10/25/2017         Social History  Social History     Tobacco Use    Smoking status: Former Smoker    Smokeless tobacco: Never Used   Substance Use Topics    Alcohol use: Yes     Comment: once in a while    Drug use: No       Objective     Visit Vitals  BP (!) 93/58 (BP 1 Location: Right upper arm, BP Patient Position: Sitting, BP Cuff Size: Adult)   Pulse 100   Temp 99.8 °F (37.7 °C) (Oral)   Resp 18   Ht 5' 2\" (1.575 m)   Wt 106 lb 9.6 oz (48.4 kg)   SpO2 96%   BMI 19.50 kg/m²     Physical Exam  Constitutional:       Appearance: Normal appearance. HENT:      Head: Normocephalic and atraumatic. Mouth/Throat:      Mouth: Mucous membranes are moist.      Pharynx: Oropharynx is clear. No oropharyngeal exudate or posterior oropharyngeal erythema. Cardiovascular:      Rate and Rhythm: Normal rate and regular rhythm. Pulmonary:      Effort: Pulmonary effort is normal. No respiratory distress. Breath sounds: Normal breath sounds. No wheezing. Skin:     General: Skin is warm. Neurological:      Mental Status: She is alert. Denver Jensen is a 37 y.o. who presents for strep throat likely viral etiology. Negative for rapid strep, influenza. R/o covid infection. Plan     1. Sore throat  - AMB POC RAPID STREP A  - AMB POC RAPID INFLUENZA TEST  - SARS-COV-2; Future  - benzocaine-menthoL (Cepacol Instamax Sore Throat) 15-20 mg lozg; 1 Lozenge by Mucous Membrane route as needed (Sore throat). Dispense: 16 Each; Refill: 0  - Tylenol as needed    2.  Dry cough  - SARS-COV-2; Future    Follow up: prn      I have discussed the aforementioned diagnoses and plan with the patient in detail. I have provided information in person and/or in AVS. All questions answered prior to discharge.     I discussed this patient with Dr. Edna Colunga (Attending Physician)   Signed By:  Otis Galarza MD    Family Medicine Resident

## 2022-03-10 NOTE — PROGRESS NOTES
1. \"Have you been to the ER, urgent care clinic since your last visit? Hospitalized since your last visit? \" yes [facility] right knee reconstruction. July 17,2021    2. \"Have you seen or consulted any other health care providers outside of the [address] [place] [address] since your last visit? \" [facility] management    3. For patients aged 39-70: Has the patient had a colonoscopy / FIT/ Cologuard? no      If the patient is female:    4. For patients aged 41-77: Has the patient had a mammogram within the past 2 years? yes      5. For patients aged 21-65: Has the patient had a pap smear? Yes a couple of years ago.     Health Maintenance Due   Topic Date Due    Hepatitis C Screening  Never done    COVID-19 Vaccine (1) Never done    DTaP/Tdap/Td series (1 - Tdap) Never done    Cervical cancer screen  03/19/2019    Flu Vaccine (1) 09/01/2021    Lipid Screen  10/13/2021

## 2022-03-10 NOTE — PATIENT INSTRUCTIONS
Sore Throat: Care Instructions  Overview     Infection by bacteria or a virus causes most sore throats. Cigarette smoke, dry air, air pollution, allergies, and yelling can also cause a sore throat. Sore throats can be painful and annoying. Fortunately, most sore throats go away on their own. If you have a bacterial infection, your doctor may prescribe antibiotics. Follow-up care is a key part of your treatment and safety. Be sure to make and go to all appointments, and call your doctor if you are having problems. It's also a good idea to know your test results and keep a list of the medicines you take. How can you care for yourself at home? · If your doctor prescribed antibiotics, take them as directed. Do not stop taking them just because you feel better. You need to take the full course of antibiotics. · Gargle with warm salt water several times a day to help reduce swelling and relieve pain. Mix 1/2 teaspoon of salt in 1 cup of warm water. · Take an over-the-counter pain medicine, such as acetaminophen (Tylenol), ibuprofen (Advil, Motrin), or naproxen (Aleve). Read and follow all instructions on the label. · Be careful when taking over-the-counter cold or flu medicines and Tylenol at the same time. Many of these medicines have acetaminophen, which is Tylenol. Read the labels to make sure that you are not taking more than the recommended dose. Too much acetaminophen (Tylenol) can be harmful. · Drink plenty of fluids. Fluids may help soothe an irritated throat. Hot fluids, such as tea or soup, may help decrease throat pain. · Use over-the-counter throat lozenges to soothe pain. Regular cough drops or hard candy may also help. These should not be given to young children because of the risk of choking. · Do not smoke or allow others to smoke around you. If you need help quitting, talk to your doctor about stop-smoking programs and medicines. These can increase your chances of quitting for good.   · Use a vaporizer or humidifier to add moisture to your bedroom. Follow the directions for cleaning the machine. When should you call for help? Call your doctor now or seek immediate medical care if:    · You have trouble breathing.     · Your sore throat gets much worse on one side.     · You have new or worse trouble swallowing.     · You have a new or higher fever. Watch closely for changes in your health, and be sure to contact your doctor if you do not get better as expected. Where can you learn more? Go to http://www.gray.com/  Enter U420 in the search box to learn more about \"Sore Throat: Care Instructions. \"  Current as of: September 8, 2021               Content Version: 13.2  © 2006-2022 Healthwise, Incorporated. Care instructions adapted under license by [employer] (which disclaims liability or warranty for this information). If you have questions about a medical condition or this instruction, always ask your healthcare professional. [place] [address] any warranty or liability for your use of this information.

## 2022-03-11 LAB
SARS-COV-2, XPLCVT: NOT DETECTED
SOURCE, COVRS: NORMAL

## 2022-07-07 ENCOUNTER — OFFICE VISIT (OUTPATIENT)
Dept: FAMILY MEDICINE CLINIC | Age: 42
End: 2022-07-07
Payer: MEDICAID

## 2022-07-07 VITALS
TEMPERATURE: 98.7 F | RESPIRATION RATE: 17 BRPM | BODY MASS INDEX: 20.06 KG/M2 | DIASTOLIC BLOOD PRESSURE: 54 MMHG | HEART RATE: 81 BPM | OXYGEN SATURATION: 97 % | WEIGHT: 109 LBS | HEIGHT: 62 IN | SYSTOLIC BLOOD PRESSURE: 84 MMHG

## 2022-07-07 DIAGNOSIS — H65.91 FLUID LEVEL BEHIND TYMPANIC MEMBRANE OF RIGHT EAR: ICD-10-CM

## 2022-07-07 DIAGNOSIS — L70.0 BLACKHEAD: ICD-10-CM

## 2022-07-07 DIAGNOSIS — Z11.59 NEED FOR HEPATITIS C SCREENING TEST: ICD-10-CM

## 2022-07-07 DIAGNOSIS — Z01.419 WELL WOMAN EXAM: Primary | ICD-10-CM

## 2022-07-07 DIAGNOSIS — J00 ACUTE RHINITIS: ICD-10-CM

## 2022-07-07 PROCEDURE — 99396 PREV VISIT EST AGE 40-64: CPT | Performed by: FAMILY MEDICINE

## 2022-07-07 PROCEDURE — 99213 OFFICE O/P EST LOW 20 MIN: CPT | Performed by: FAMILY MEDICINE

## 2022-07-07 RX ORDER — FLUTICASONE PROPIONATE 50 MCG
2 SPRAY, SUSPENSION (ML) NASAL DAILY
Qty: 1 EACH | Refills: 3 | Status: SHIPPED | OUTPATIENT
Start: 2022-07-07

## 2022-07-07 RX ORDER — IBUPROFEN 800 MG/1
800 TABLET ORAL 2 TIMES DAILY
COMMUNITY
Start: 2022-05-26

## 2022-07-07 NOTE — PROGRESS NOTES
CC: KATELYN    HPI: Pt is a 37 y.o. female who presents for [facility]. Last pap: 2014  History of abnormal paps?: No  Abnormal vaginal bleeding or discharge?: No  Desire to be tested for STDs today?: No  Last mammogram: Never  New lumps or bumps?: Has one spot on the skin of L breast, no lump  Family history of ovarian, uterine or breast cancer?: Breast cancer in [facility], otherwise no      Past Medical History:   Diagnosis Date    ADD (attention deficit disorder)     Anemia     Anxiety     Binge-eating and purging type anorexia nervosa     CMT (Charcot-Anabella-Tooth disease)     Depression        Family History   Problem Relation Age of Onset    Cancer Maternal Grandmother     Cancer Paternal Grandmother     Diabetes Paternal Grandmother        Social History     Tobacco Use    Smoking status: Current Every Day Smoker     Packs/day: 1.00     Years: 26.00     Pack years: 26.00     Types: Cigarettes    Smokeless tobacco: Never Used   Vaping Use    Vaping Use: Never used   Substance Use Topics    Alcohol use: Not Currently     Comment: once in a while    Drug use: No         PE:  Visit Vitals  BP (!) 84/54 (BP 1 Location: Right arm, BP Patient Position: Sitting, BP Cuff Size: Adult)   Pulse 81   Temp 98.7 °F (37.1 °C) (Oral)   Resp 17   Ht 5' 2\" (1.575 m)   Wt 109 lb (49.4 kg)   SpO2 97%   BMI 19.94 kg/m²     Gen: Pt sitting in chair, in NAD  Head: Normocephalic, atraumatic  Eyes: Sclera anicteric, EOM grossly intact, PERRL  Throat: MMM, normal lips, tongue, teeth and gums  Neck: Supple, no LAD, no thyromegaly or carotid bruits  CVS: Normal S1, S2, no m/r/g  Resp: CTAB, no wheezes or rales  Left breast: No nipple discharge. No masses or tenderness on palpation. Pinpoint blackhead at 1 o'clock without erythema, tenderness or drainage. : Normal external female genitalia. Vaginal mucosa pink, moist. Small amount white discharge. Cervix without visible lesions.    Extrem: Atraumatic, no cyanosis or edema  Pulses: 2+ Skin: Warm, dry  Neuro: Alert, oriented, appropriate    A/P: Pt is a 37 y.o. female who presents for [facility]  Encounter Diagnoses     ICD-10-CM ICD-9-CM   1. Well woman exam  Z01.419 V72.31   2. Need for hepatitis C screening test  Z11.59 V73.89   3. Acute rhinitis  J00 460   4. Fluid level behind tympanic membrane of right ear  H65.91 381.4     1. Well woman exam  - LIPID PANEL; Future  - PAP IG, APTIMA HPV AND RFX 16/18,45 ([id number]); Future  - PAP IG, APTIMA HPV AND RFX 16/18,45 ([id number])  - LIPID PANEL    2. Need for hepatitis C screening test  - HEPATITIS C AB; Future  - HEPATITIS C AB    3. Acute rhinitis: See separate note from today  - fluticasone propionate (FLONASE) 50 mcg/actuation nasal spray; 2 Sprays by Both Nostrils route daily. Dispense: 1 Each; Refill: 3    4. Fluid level behind tympanic membrane of right ear: See separate note from today  - fluticasone propionate (FLONASE) 50 mcg/actuation nasal spray; 2 Sprays by Both Nostrils route daily. Dispense: 1 Each; Refill: 3       RTC in 1 year for [facility], or sooner prn    Discussed diagnoses in detail with patient. Medication risks/benefits/side effects discussed with patient. All of the patient's questions were addressed. The patient understands and agrees with our plan of care. The patient knows to call back if they are unsure of or forget any changes we discussed today or if the symptoms change. The patient received an After-Visit Summary which contains VS, orders, medication list and allergy list. This can be used as a \"mini-medical record\" should they have to seek medical care while out of town. Current Outpatient Medications on File Prior to Visit   Medication Sig Dispense Refill    topiramate (TOPAMAX) 100 mg tablet Take 100 mg by mouth two (2) times a day.  venlafaxine-SR (EFFEXOR-XR) 75 mg capsule Take 75 mg by mouth daily.       cyclobenzaprine (FLEXERIL) 10 mg tablet TAKE 1 TABLET BY MOUTH THREE TIMES DAILY AS NEEDED FOR MUSCLE SPASM PAIN 30 Tablet 1    butalbital-acetaminophen-caffeine (FIORICET, ESGIC) -40 mg per tablet Take 1 Tab by mouth every six (6) hours as needed for Headache. 30 Tab 0    cetirizine (ZYRTEC) 10 mg tablet Take 1 Tab by mouth daily. 30 Tab 2    ibuprofen (MOTRIN) 800 mg tablet Take 800 mg by mouth two (2) times a day.  tiZANidine (ZANAFLEX) 4 mg tablet TAKE 1 TABLET BY MOUTH EVERY 8 HOURS (Patient not taking: Reported on 7/7/2022)      naproxen (NAPROSYN) 500 mg tablet Take 500 mg by mouth two (2) times daily (with meals). (Patient not taking: Reported on 7/7/2022)      benzocaine-menthoL (Cepacol Instamax Sore Throat) 15-20 mg lozg 1 Lozenge by Mucous Membrane route as needed (Sore throat). (Patient not taking: Reported on 7/7/2022) 16 Each 0    pregabalin (Lyrica) 200 mg capsule Take 200 mg by mouth two (2) times a day. (Patient not taking: Reported on 3/10/2022)      busPIRone (BUSPAR) 10 mg tablet Take 1 Tab by mouth three (3) times daily. (Patient not taking: Reported on 7/7/2022) 90 Tab 0     No current facility-administered medications on file prior to visit.

## 2022-07-07 NOTE — PROGRESS NOTES
Chief Complaint   Patient presents with    Well Woman     would like to discuss being placed back on alprazolam.     1. \"Have you been to the ER, urgent care clinic since your last visit? Hospitalized since your last visit? \" No    2. \"Have you seen or consulted any other health care providers outside of the [address] [place] [address] since your last visit? \" No     3. For patients aged 39-70: Has the patient had a colonoscopy / FIT/ Cologuard? NA - based on age      If the patient is female:    4. For patients aged 41-77: Has the patient had a mammogram within the past 2 years? No      5. For patients aged 21-65: Has the patient had a pap smear?  No    Health Maintenance Due   Topic Date Due    Hepatitis C Screening  Never done    COVID-19 Vaccine (1) Never done    DTaP/Tdap/Td series (1 - Tdap) Never done    Cervical cancer screen  03/19/2019    Lipid Screen  10/13/2021

## 2022-07-07 NOTE — PATIENT INSTRUCTIONS
A Healthy Lifestyle: Care Instructions  Your Care Instructions     A healthy lifestyle can help you feel good, stay at a healthy weight, and have plenty of energy for both work and play. A healthy lifestyle is something you can share with your whole family. A healthy lifestyle also can lower your risk for serious health problems, such as high blood pressure, heart disease, and diabetes. You can follow a few steps listed below to improve your health and the health of your family. Follow-up care is a key part of your treatment and safety. Be sure to make and go to all appointments, and call your doctor if you are having problems. It's also a good idea to know your test results and keep a list of the medicines you take. How can you care for yourself at home? · Do not eat too much sugar, fat, or fast foods. You can still have dessert and treats now and then. The goal is moderation. · Start small to improve your eating habits. Pay attention to portion sizes, drink less juice and soda pop, and eat more fruits and vegetables. ? Eat a healthy amount of food. A 3-ounce serving of meat, for example, is about the size of a deck of cards. Fill the rest of your plate with vegetables and whole grains. ? Limit the amount of soda and sports drinks you have every day. Drink more water when you are thirsty. ? Eat plenty of fruits and vegetables every day. Have an apple or some carrot sticks as an afternoon snack instead of a candy bar. Try to have fruits and/or vegetables at every meal.  · Make exercise part of your daily routine. You may want to start with simple activities, such as walking, bicycling, or slow swimming. Try to be active 30 to 60 minutes every day. You do not need to do all 30 to 60 minutes all at once. For example, you can exercise 3 times a day for 10 or 20 minutes.  Moderate exercise is safe for most people, but it is always a good idea to talk to your doctor before starting an exercise program.  · Keep moving. Kellie Broaden the lawn, work in the garden, or [employer]. Take the stairs instead of the elevator at work. · If you smoke, quit. People who smoke have an increased risk for heart attack, stroke, cancer, and other lung illnesses. Quitting is hard, but there are ways to boost your chance of quitting tobacco for good. ? Use nicotine gum, patches, or lozenges. ? Ask your doctor about stop-smoking programs and medicines. ? Keep trying. In addition to reducing your risk of diseases in the future, you will notice some benefits soon after you stop using tobacco. If you have shortness of breath or asthma symptoms, they will likely get better within a few weeks after you quit. · Limit how much alcohol you drink. Moderate amounts of alcohol (up to 2 drinks a day for men, 1 drink a day for women) are okay. But drinking too much can lead to liver problems, high blood pressure, and other health problems. Family health  If you have a family, there are many things you can do together to improve your health. · Eat meals together as a family as often as possible. · Eat healthy foods. This includes fruits, vegetables, lean meats and dairy, and whole grains. · Include your family in your fitness plan. Most people think of activities such as jogging or tennis as the way to fitness, but there are many ways you and your family can be more active. Anything that makes you breathe hard and gets your heart pumping is exercise. Here are some tips:  ? Walk to do errands or to take your child to school or the bus.  ? Go for a family bike ride after dinner instead of watching TV. Where can you learn more? Go to http://www.gray.com/  Enter R427 in the search box to learn more about \"A Healthy Lifestyle: Care Instructions. \"  Current as of: June 16, 2021               Content Version: 13.2  © 5147-6506 Healthwise, [employer].    Care instructions adapted under license by [employer] (which disclaims liability or warranty for this information). If you have questions about a medical condition or this instruction, always ask your healthcare professional. [address] any warranty or liability for your use of this information.

## 2022-07-07 NOTE — PROGRESS NOTES
CC: Ear pain, spot on breast    HPI: Pt is a 37 y.o. female who presents for ear pain, spot on breast. Ear pain started last week after [place], in both ears, L>R. Denies fever, ear discharge, nasal congestion, sore throat and cough. She has not tried anything for symptoms. She has a spot on her L breast. She is not sure how long it's been there. It does not hurt and does not feel like a lump. In the past she had a benign lump in her breast that was removed, but that was in a different location. Her BP is low but she reports this is normal for her. She reports lightheadedness from time to time at baseline, it has not worsened. She does not like to drink water but has recently bought a new type of water bottle that adds flavor to the water and is hoping that will help.       Past Medical History:   Diagnosis Date    ADD (attention deficit disorder)     Anemia     Anxiety     Binge-eating and purging type anorexia nervosa     CMT (Charcot-Anabella-Tooth disease)     Depression        Family History   Problem Relation Age of Onset    Cancer Maternal Grandmother     Cancer Paternal Grandmother     Diabetes Paternal Grandmother        Social History     Tobacco Use    Smoking status: Current Every Day Smoker     Packs/day: 1.00     Years: 26.00     Pack years: 26.00     Types: Cigarettes    Smokeless tobacco: Never Used   Vaping Use    Vaping Use: Never used   Substance Use Topics    Alcohol use: Not Currently     Comment: once in a while    Drug use: No       ROS:  Per HPI    PE:  Visit Vitals  BP (!) 84/54 (BP 1 Location: Right arm, BP Patient Position: Sitting, BP Cuff Size: Adult)   Pulse 81   Temp 98.7 °F (37.1 °C) (Oral)   Resp 17   Ht 5' 2\" (1.575 m)   Wt 109 lb (49.4 kg)   SpO2 97%   BMI 19.94 kg/m²     Gen: Pt sitting in chair, in NAD  Head: Normocephalic, atraumatic  Eyes: Sclera anicteric, EOM grossly intact, PERRL  Ears: TM's pearly with good light reflex b/l, no pain on introduction of the otoscope or with tragal pressure. Nose: Normal nasal mucosa  Throat: MMM, normal lips, tongue and gums. No pharyngeal erythema or exudates. Neck: Supple, no LAD, no thyromegaly or carotid bruits  CVS: Normal S1, S2, no m/r/g  Resp: CTAB, no wheezes or rales  Left breast: Pinpoint blackhead on L breast without erythema, edema, or drainage. No masses palpated on exam.   Extrem: Atraumatic, no cyanosis or edema  Pulses: 2+   Skin: Warm, dry  Neuro: Alert, oriented, appropriate      A/P:   Encounter Diagnoses     ICD-10-CM ICD-9-CM   1. Well woman exam  Z01.419 V72.31   2. Need for hepatitis C screening test  Z11.59 V73.89   3. Acute rhinitis  J00 460   4. Fluid level behind tympanic membrane of right ear  H65.91 381.4   5. Blackhead  L70.0 706.1     1. Well woman exam: See separate note from today. - LIPID PANEL; Future  - PAP IG, APTIMA HPV AND RFX 16/18,45 ([id number]); Future  - PAP IG, APTIMA HPV AND RFX 16/18,45 ([id number])  - LIPID PANEL    2. Need for hepatitis C screening test  - HEPATITIS C AB; Future  - HEPATITIS C AB    3. Acute rhinitis  - fluticasone propionate (FLONASE) 50 mcg/actuation nasal spray; 2 Sprays by Both Nostrils route daily. Dispense: 1 Each; Refill: 3    4. Fluid level behind tympanic membrane of right ear: Likely cause of symptoms  - fluticasone propionate (FLONASE) 50 mcg/actuation nasal spray; 2 Sprays by Both Nostrils route daily. Dispense: 1 Each; Refill: 3    5. Blackhead: Advised warm compresses and keep an eye on the area. No concerning findings on breast exam to necessitate imaging at this time. RTC prn if symptoms worsen or fail to improve    Discussed diagnoses in detail with patient. Medication risks/benefits/side effects discussed with patient. All of the patient's questions were addressed. The patient understands and agrees with our plan of care.   The patient knows to call back if they are unsure of or forget any changes we discussed today or if the symptoms change. The patient received an After-Visit Summary which contains VS, orders, medication list and allergy list. This can be used as a \"mini-medical record\" should they have to seek medical care while out of town. Current Outpatient Medications on File Prior to Visit   Medication Sig Dispense Refill    topiramate (TOPAMAX) 100 mg tablet Take 100 mg by mouth two (2) times a day.  venlafaxine-SR (EFFEXOR-XR) 75 mg capsule Take 75 mg by mouth daily.  cyclobenzaprine (FLEXERIL) 10 mg tablet TAKE 1 TABLET BY MOUTH THREE TIMES DAILY AS NEEDED FOR MUSCLE SPASM PAIN 30 Tablet 1    butalbital-acetaminophen-caffeine (FIORICET, ESGIC) -40 mg per tablet Take 1 Tab by mouth every six (6) hours as needed for Headache. 30 Tab 0    cetirizine (ZYRTEC) 10 mg tablet Take 1 Tab by mouth daily. 30 Tab 2    ibuprofen (MOTRIN) 800 mg tablet Take 800 mg by mouth two (2) times a day.  tiZANidine (ZANAFLEX) 4 mg tablet TAKE 1 TABLET BY MOUTH EVERY 8 HOURS (Patient not taking: Reported on 7/7/2022)      naproxen (NAPROSYN) 500 mg tablet Take 500 mg by mouth two (2) times daily (with meals). (Patient not taking: Reported on 7/7/2022)      benzocaine-menthoL (Cepacol Instamax Sore Throat) 15-20 mg lozg 1 Lozenge by Mucous Membrane route as needed (Sore throat). (Patient not taking: Reported on 7/7/2022) 16 Each 0    pregabalin (Lyrica) 200 mg capsule Take 200 mg by mouth two (2) times a day. (Patient not taking: Reported on 3/10/2022)      busPIRone (BUSPAR) 10 mg tablet Take 1 Tab by mouth three (3) times daily. (Patient not taking: Reported on 7/7/2022) 90 Tab 0     No current facility-administered medications on file prior to visit.

## 2022-07-08 LAB
CHOLEST SERPL-MCNC: 200 MG/DL
HCV AB SERPL QL IA: NONREACTIVE
HDLC SERPL-MCNC: 58 MG/DL
HDLC SERPL: 3.4 {RATIO} (ref 0–5)
LDLC SERPL CALC-MCNC: 123.4 MG/DL (ref 0–100)
TRIGL SERPL-MCNC: 93 MG/DL (ref ?–150)
VLDLC SERPL CALC-MCNC: 18.6 MG/DL

## 2022-07-17 LAB
CYTOLOGIST CVX/VAG CYTO: ABNORMAL
CYTOLOGY CVX/VAG DOC CYTO: ABNORMAL
CYTOLOGY CVX/VAG DOC THIN PREP: ABNORMAL
HPV GENOTYPE 18,45: NEGATIVE
HPV I/H RISK 4 DNA CVX QL PROBE+SIG AMP: POSITIVE
HPV16 DNA CVX QL PROBE+SIG AMP: NEGATIVE
Lab: ABNORMAL
Lab: ABNORMAL
OTHER STN SPEC: ABNORMAL
SPECIMEN STATUS REPORT, ROLRST: NORMAL
STAT OF ADQ CVX/VAG CYTO-IMP: ABNORMAL

## 2022-07-19 ENCOUNTER — TELEPHONE (OUTPATIENT)
Dept: FAMILY MEDICINE CLINIC | Age: 42
End: 2022-07-19

## 2022-07-19 DIAGNOSIS — R87.618 OTHER ABNORMAL CYTOLOGICAL FINDING OF SPECIMEN FROM CERVIX: ICD-10-CM

## 2022-07-19 PROBLEM — R87.619 ABNORMAL PAP SMEAR OF CERVIX: Status: ACTIVE | Noted: 2022-07-19

## 2022-07-19 NOTE — TELEPHONE ENCOUNTER
Called to advise pt of recent results. Pap showed normal cells but was HPV positive. The recommendation for this is to repeat pap in 1 year. All questions answered and pt feels comfortable with the plan of care.

## 2023-05-22 RX ORDER — TOPIRAMATE 100 MG/1
100 TABLET, FILM COATED ORAL 2 TIMES DAILY
COMMUNITY
Start: 2022-01-28 | End: 2023-07-07 | Stop reason: SDUPTHER

## 2023-05-22 RX ORDER — CYCLOBENZAPRINE HCL 10 MG
TABLET ORAL
COMMUNITY
Start: 2021-07-22

## 2023-05-22 RX ORDER — IBUPROFEN 800 MG/1
800 TABLET ORAL 2 TIMES DAILY
COMMUNITY
Start: 2022-05-26

## 2023-05-22 RX ORDER — FLUTICASONE PROPIONATE 50 MCG
2 SPRAY, SUSPENSION (ML) NASAL DAILY
COMMUNITY
Start: 2022-07-07 | End: 2023-07-07 | Stop reason: SDUPTHER

## 2023-05-22 RX ORDER — CETIRIZINE HYDROCHLORIDE 10 MG/1
10 TABLET ORAL DAILY
COMMUNITY
Start: 2020-12-29 | End: 2023-07-07 | Stop reason: SDUPTHER

## 2023-05-22 RX ORDER — BUTALBITAL, ACETAMINOPHEN AND CAFFEINE 50; 325; 40 MG/1; MG/1; MG/1
1 TABLET ORAL EVERY 6 HOURS PRN
COMMUNITY
Start: 2021-01-04 | End: 2023-07-07 | Stop reason: SDUPTHER

## 2023-05-22 RX ORDER — VENLAFAXINE HYDROCHLORIDE 75 MG/1
75 CAPSULE, EXTENDED RELEASE ORAL DAILY
COMMUNITY
Start: 2022-03-05 | End: 2023-07-07 | Stop reason: SDUPTHER

## 2023-06-27 ENCOUNTER — TELEPHONE (OUTPATIENT)
Facility: CLINIC | Age: 43
End: 2023-06-27

## 2023-07-07 ENCOUNTER — OFFICE VISIT (OUTPATIENT)
Facility: CLINIC | Age: 43
End: 2023-07-07

## 2023-07-07 VITALS
RESPIRATION RATE: 14 BRPM | DIASTOLIC BLOOD PRESSURE: 60 MMHG | BODY MASS INDEX: 19.54 KG/M2 | HEART RATE: 76 BPM | WEIGHT: 106.2 LBS | SYSTOLIC BLOOD PRESSURE: 95 MMHG | OXYGEN SATURATION: 100 % | TEMPERATURE: 98.5 F | HEIGHT: 62 IN

## 2023-07-07 DIAGNOSIS — F32.A ANXIETY AND DEPRESSION: Primary | ICD-10-CM

## 2023-07-07 DIAGNOSIS — G60.0 CMT (CHARCOT-MARIE-TOOTH DISEASE): ICD-10-CM

## 2023-07-07 DIAGNOSIS — F41.9 ANXIETY AND DEPRESSION: Primary | ICD-10-CM

## 2023-07-07 DIAGNOSIS — Z72.0 TOBACCO ABUSE: ICD-10-CM

## 2023-07-07 DIAGNOSIS — Z79.899 BENZODIAZEPINE CONTRACT EXISTS: ICD-10-CM

## 2023-07-07 DIAGNOSIS — Z91.09 ENVIRONMENTAL ALLERGIES: ICD-10-CM

## 2023-07-07 RX ORDER — CETIRIZINE HYDROCHLORIDE 10 MG/1
10 TABLET ORAL DAILY
Qty: 90 TABLET | Refills: 1 | Status: SHIPPED | OUTPATIENT
Start: 2023-07-07

## 2023-07-07 RX ORDER — FLUTICASONE PROPIONATE 50 MCG
2 SPRAY, SUSPENSION (ML) NASAL DAILY
Qty: 16 G | Refills: 2 | Status: SHIPPED | OUTPATIENT
Start: 2023-07-07

## 2023-07-07 RX ORDER — TIZANIDINE 4 MG/1
4 TABLET ORAL 3 TIMES DAILY PRN
COMMUNITY
Start: 2023-06-20 | End: 2023-07-07 | Stop reason: ALTCHOICE

## 2023-07-07 RX ORDER — BUTALBITAL, ACETAMINOPHEN AND CAFFEINE 50; 325; 40 MG/1; MG/1; MG/1
1 TABLET ORAL EVERY 6 HOURS PRN
Qty: 30 TABLET | Refills: 1 | Status: SHIPPED | OUTPATIENT
Start: 2023-07-07

## 2023-07-07 RX ORDER — IBUPROFEN 600 MG/1
TABLET ORAL
COMMUNITY
Start: 2023-06-20 | End: 2023-07-07

## 2023-07-07 RX ORDER — VENLAFAXINE HYDROCHLORIDE 75 MG/1
75 CAPSULE, EXTENDED RELEASE ORAL DAILY
Qty: 90 CAPSULE | Refills: 1 | Status: SHIPPED | OUTPATIENT
Start: 2023-07-07

## 2023-07-07 RX ORDER — TOPIRAMATE 25 MG/1
25 TABLET ORAL 2 TIMES DAILY
Qty: 60 TABLET | Refills: 0 | Status: SHIPPED | OUTPATIENT
Start: 2023-07-07

## 2023-07-07 SDOH — ECONOMIC STABILITY: FOOD INSECURITY: WITHIN THE PAST 12 MONTHS, YOU WORRIED THAT YOUR FOOD WOULD RUN OUT BEFORE YOU GOT MONEY TO BUY MORE.: NEVER TRUE

## 2023-07-07 SDOH — ECONOMIC STABILITY: HOUSING INSECURITY
IN THE LAST 12 MONTHS, WAS THERE A TIME WHEN YOU DID NOT HAVE A STEADY PLACE TO SLEEP OR SLEPT IN A SHELTER (INCLUDING NOW)?: NO

## 2023-07-07 SDOH — ECONOMIC STABILITY: FOOD INSECURITY: WITHIN THE PAST 12 MONTHS, THE FOOD YOU BOUGHT JUST DIDN'T LAST AND YOU DIDN'T HAVE MONEY TO GET MORE.: NEVER TRUE

## 2023-07-07 SDOH — ECONOMIC STABILITY: INCOME INSECURITY: HOW HARD IS IT FOR YOU TO PAY FOR THE VERY BASICS LIKE FOOD, HOUSING, MEDICAL CARE, AND HEATING?: NOT HARD AT ALL

## 2023-07-07 ASSESSMENT — PATIENT HEALTH QUESTIONNAIRE - PHQ9
3. TROUBLE FALLING OR STAYING ASLEEP: 3
SUM OF ALL RESPONSES TO PHQ QUESTIONS 1-9: 22
SUM OF ALL RESPONSES TO PHQ QUESTIONS 1-9: 22
10. IF YOU CHECKED OFF ANY PROBLEMS, HOW DIFFICULT HAVE THESE PROBLEMS MADE IT FOR YOU TO DO YOUR WORK, TAKE CARE OF THINGS AT HOME, OR GET ALONG WITH OTHER PEOPLE: 1
9. THOUGHTS THAT YOU WOULD BE BETTER OFF DEAD, OR OF HURTING YOURSELF: 0
1. LITTLE INTEREST OR PLEASURE IN DOING THINGS: 3
6. FEELING BAD ABOUT YOURSELF - OR THAT YOU ARE A FAILURE OR HAVE LET YOURSELF OR YOUR FAMILY DOWN: 3
SUM OF ALL RESPONSES TO PHQ QUESTIONS 1-9: 22
7. TROUBLE CONCENTRATING ON THINGS, SUCH AS READING THE NEWSPAPER OR WATCHING TELEVISION: 1
8. MOVING OR SPEAKING SO SLOWLY THAT OTHER PEOPLE COULD HAVE NOTICED. OR THE OPPOSITE, BEING SO FIGETY OR RESTLESS THAT YOU HAVE BEEN MOVING AROUND A LOT MORE THAN USUAL: 3
2. FEELING DOWN, DEPRESSED OR HOPELESS: 3
4. FEELING TIRED OR HAVING LITTLE ENERGY: 3
SUM OF ALL RESPONSES TO PHQ9 QUESTIONS 1 & 2: 6
5. POOR APPETITE OR OVEREATING: 3
SUM OF ALL RESPONSES TO PHQ QUESTIONS 1-9: 22

## 2023-07-07 ASSESSMENT — COLUMBIA-SUICIDE SEVERITY RATING SCALE - C-SSRS
1. WITHIN THE PAST MONTH, HAVE YOU WISHED YOU WERE DEAD OR WISHED YOU COULD GO TO SLEEP AND NOT WAKE UP?: NO
6. HAVE YOU EVER DONE ANYTHING, STARTED TO DO ANYTHING, OR PREPARED TO DO ANYTHING TO END YOUR LIFE?: NO
2. HAVE YOU ACTUALLY HAD ANY THOUGHTS OF KILLING YOURSELF?: NO

## 2023-07-10 ENCOUNTER — TELEPHONE (OUTPATIENT)
Dept: FAMILY MEDICINE CLINIC | Age: 43
End: 2023-07-10

## 2023-07-10 ENCOUNTER — TELEPHONE (OUTPATIENT)
Facility: CLINIC | Age: 43
End: 2023-07-10

## 2023-07-12 ENCOUNTER — TELEPHONE (OUTPATIENT)
Facility: CLINIC | Age: 43
End: 2023-07-12

## 2023-07-12 RX ORDER — LORAZEPAM 0.5 MG/1
0.5 TABLET ORAL 2 TIMES DAILY PRN
Qty: 30 TABLET | Refills: 0 | Status: SHIPPED | OUTPATIENT
Start: 2023-07-12 | End: 2023-08-11

## 2023-07-12 NOTE — TELEPHONE ENCOUNTER
----- Message from Delmi Giles sent at 7/12/2023 12:59 PM EDT -----  Subject: Message to Provider    QUESTIONS  Information for Provider? Pt is calling in stating she is suppose to have   Xanax called into the pharmacy but the pharmacy has not got the script. [facility]. Pt would like to have a call regarding this.   ---------------------------------------------------------------------------  --------------  [address] [place]  [phone]; OK to leave message on voicemail  ---------------------------------------------------------------------------  --------------  SCRIPT ANSWERS  Relationship to Patient?  Self

## 2023-10-09 ENCOUNTER — OFFICE VISIT (OUTPATIENT)
Facility: CLINIC | Age: 43
End: 2023-10-09
Payer: MEDICARE

## 2023-10-09 VITALS
WEIGHT: 103 LBS | HEIGHT: 62 IN | SYSTOLIC BLOOD PRESSURE: 90 MMHG | OXYGEN SATURATION: 98 % | HEART RATE: 76 BPM | BODY MASS INDEX: 18.95 KG/M2 | TEMPERATURE: 97 F | RESPIRATION RATE: 16 BRPM | DIASTOLIC BLOOD PRESSURE: 58 MMHG

## 2023-10-09 DIAGNOSIS — F41.9 ANXIETY AND DEPRESSION: Primary | ICD-10-CM

## 2023-10-09 DIAGNOSIS — Z72.0 TOBACCO ABUSE: ICD-10-CM

## 2023-10-09 DIAGNOSIS — F32.A ANXIETY AND DEPRESSION: Primary | ICD-10-CM

## 2023-10-09 PROCEDURE — 99213 OFFICE O/P EST LOW 20 MIN: CPT | Performed by: FAMILY MEDICINE

## 2023-10-09 RX ORDER — LORAZEPAM 0.5 MG/1
0.5 TABLET ORAL 3 TIMES DAILY PRN
Qty: 12 TABLET | Refills: 0 | COMMUNITY
Start: 2023-10-09

## 2023-10-09 RX ORDER — TIZANIDINE 4 MG/1
4 TABLET ORAL 3 TIMES DAILY PRN
Qty: 30 TABLET | Refills: 0 | COMMUNITY
Start: 2023-10-09

## 2023-10-16 DIAGNOSIS — F32.A ANXIETY AND DEPRESSION: ICD-10-CM

## 2023-10-16 DIAGNOSIS — F41.9 ANXIETY AND DEPRESSION: ICD-10-CM

## 2023-10-18 RX ORDER — LORAZEPAM 0.5 MG/1
TABLET ORAL
Qty: 60 TABLET | Refills: 0 | Status: SHIPPED | OUTPATIENT
Start: 2023-10-18 | End: 2023-11-16

## 2023-11-09 ENCOUNTER — TELEPHONE (OUTPATIENT)
Facility: CLINIC | Age: 43
End: 2023-11-09

## 2023-11-09 NOTE — TELEPHONE ENCOUNTER
Pt called stating she think has a URI,as she has been experiencing a cough, congestion and sore throat for 5 days. Pt would like to know if  will send her in a Rx for Prednisone as that what he normally does for her when she has this issue. Pt stated if she needs an appt, she would like to be fit in for a VV. Please advise. Ari Roth

## 2023-11-21 ENCOUNTER — OFFICE VISIT (OUTPATIENT)
Facility: CLINIC | Age: 43
End: 2023-11-21
Payer: MEDICARE

## 2023-11-21 ENCOUNTER — TELEPHONE (OUTPATIENT)
Facility: CLINIC | Age: 43
End: 2023-11-21

## 2023-11-21 VITALS
SYSTOLIC BLOOD PRESSURE: 104 MMHG | DIASTOLIC BLOOD PRESSURE: 60 MMHG | HEART RATE: 89 BPM | RESPIRATION RATE: 18 BRPM | HEIGHT: 62 IN | TEMPERATURE: 98.8 F | OXYGEN SATURATION: 99 % | BODY MASS INDEX: 19.32 KG/M2 | WEIGHT: 105 LBS

## 2023-11-21 DIAGNOSIS — R05.1 ACUTE COUGH: Primary | ICD-10-CM

## 2023-11-21 DIAGNOSIS — J98.8 CONGESTION OF UPPER AIRWAY: ICD-10-CM

## 2023-11-21 DIAGNOSIS — J02.9 SORE THROAT: ICD-10-CM

## 2023-11-21 LAB
GROUP A STREP ANTIGEN, POC: NEGATIVE
INFLUENZA A ANTIGEN, POC: NEGATIVE
INFLUENZA B ANTIGEN, POC: NEGATIVE
VALID INTERNAL CONTROL, POC: YES
VALID INTERNAL CONTROL, POC: YES

## 2023-11-21 PROCEDURE — 99213 OFFICE O/P EST LOW 20 MIN: CPT | Performed by: STUDENT IN AN ORGANIZED HEALTH CARE EDUCATION/TRAINING PROGRAM

## 2023-11-21 PROCEDURE — 87804 INFLUENZA ASSAY W/OPTIC: CPT | Performed by: STUDENT IN AN ORGANIZED HEALTH CARE EDUCATION/TRAINING PROGRAM

## 2023-11-21 PROCEDURE — 87880 STREP A ASSAY W/OPTIC: CPT | Performed by: STUDENT IN AN ORGANIZED HEALTH CARE EDUCATION/TRAINING PROGRAM

## 2023-11-21 RX ORDER — GUAIFENESIN 600 MG/1
600 TABLET, EXTENDED RELEASE ORAL 2 TIMES DAILY
Qty: 30 TABLET | Refills: 0 | Status: SHIPPED | OUTPATIENT
Start: 2023-11-21 | End: 2023-12-06

## 2023-11-21 RX ORDER — BENZONATATE 100 MG/1
100 CAPSULE ORAL 3 TIMES DAILY PRN
Qty: 30 CAPSULE | Refills: 0 | Status: SHIPPED | OUTPATIENT
Start: 2023-11-21 | End: 2023-12-01

## 2023-11-21 RX ORDER — BENZOCAINE AND MENTHOL, UNSPECIFIED FORM 15; 2.3 MG/1; MG/1
1 LOZENGE ORAL
Qty: 30 LOZENGE | Refills: 0 | Status: SHIPPED | OUTPATIENT
Start: 2023-11-21

## 2023-11-21 NOTE — TELEPHONE ENCOUNTER
Pt called stating that Dr. Dea Anne was suppose to send a RX for a strong cough medicine and cough drops that numbs the throat but nothing ha been called in as of yet. Please advise. Caridad Tang

## 2023-11-21 NOTE — RESULT ENCOUNTER NOTE
Strep test negative. Influenza test negative. Results discussed with patient during office visit. Please see office visit note.

## 2023-11-23 LAB — SARS-COV-2 RNA RESP QL NAA+PROBE: NOT DETECTED

## 2023-11-24 LAB — S PYO THROAT QL CULT: NEGATIVE

## 2024-01-09 ENCOUNTER — OFFICE VISIT (OUTPATIENT)
Facility: CLINIC | Age: 44
End: 2024-01-09
Payer: MEDICAID

## 2024-01-09 VITALS
DIASTOLIC BLOOD PRESSURE: 57 MMHG | OXYGEN SATURATION: 98 % | HEART RATE: 82 BPM | HEIGHT: 62 IN | WEIGHT: 104.8 LBS | SYSTOLIC BLOOD PRESSURE: 92 MMHG | TEMPERATURE: 97.5 F | BODY MASS INDEX: 19.29 KG/M2 | RESPIRATION RATE: 14 BRPM

## 2024-01-09 DIAGNOSIS — F41.9 ANXIETY AND DEPRESSION: Primary | ICD-10-CM

## 2024-01-09 DIAGNOSIS — R63.6 UNDERWEIGHT: ICD-10-CM

## 2024-01-09 DIAGNOSIS — F32.A ANXIETY AND DEPRESSION: Primary | ICD-10-CM

## 2024-01-09 DIAGNOSIS — Z72.0 TOBACCO ABUSE: ICD-10-CM

## 2024-01-09 PROCEDURE — 99214 OFFICE O/P EST MOD 30 MIN: CPT | Performed by: FAMILY MEDICINE

## 2024-01-09 RX ORDER — LORAZEPAM 0.5 MG/1
TABLET ORAL
Qty: 60 TABLET | Refills: 0 | Status: SHIPPED | OUTPATIENT
Start: 2024-01-09 | End: 2024-02-06

## 2024-01-09 ASSESSMENT — PATIENT HEALTH QUESTIONNAIRE - PHQ9
SUM OF ALL RESPONSES TO PHQ QUESTIONS 1-9: 9
9. THOUGHTS THAT YOU WOULD BE BETTER OFF DEAD, OR OF HURTING YOURSELF: 0
6. FEELING BAD ABOUT YOURSELF - OR THAT YOU ARE A FAILURE OR HAVE LET YOURSELF OR YOUR FAMILY DOWN: 0
5. POOR APPETITE OR OVEREATING: 3
SUM OF ALL RESPONSES TO PHQ QUESTIONS 1-9: 9
4. FEELING TIRED OR HAVING LITTLE ENERGY: 3
10. IF YOU CHECKED OFF ANY PROBLEMS, HOW DIFFICULT HAVE THESE PROBLEMS MADE IT FOR YOU TO DO YOUR WORK, TAKE CARE OF THINGS AT HOME, OR GET ALONG WITH OTHER PEOPLE: 3
2. FEELING DOWN, DEPRESSED OR HOPELESS: 0
3. TROUBLE FALLING OR STAYING ASLEEP: 3
1. LITTLE INTEREST OR PLEASURE IN DOING THINGS: 0
SUM OF ALL RESPONSES TO PHQ QUESTIONS 1-9: 9
SUM OF ALL RESPONSES TO PHQ QUESTIONS 1-9: 9
7. TROUBLE CONCENTRATING ON THINGS, SUCH AS READING THE NEWSPAPER OR WATCHING TELEVISION: 0
8. MOVING OR SPEAKING SO SLOWLY THAT OTHER PEOPLE COULD HAVE NOTICED. OR THE OPPOSITE, BEING SO FIGETY OR RESTLESS THAT YOU HAVE BEEN MOVING AROUND A LOT MORE THAN USUAL: 0
SUM OF ALL RESPONSES TO PHQ9 QUESTIONS 1 & 2: 0

## 2024-01-10 NOTE — PROGRESS NOTES
Chief Complaint   Patient presents with    Follow-up Chronic Condition         \"Have you been to the ER, urgent care clinic since your last visit?  Hospitalized since your last visit?\"    NO    “Have you seen or consulted any other health care providers outside of [facility] since your last visit?”    NO           Health Maintenance Due   Topic Date Due    Hepatitis B vaccine (1 of 3 - 3-dose series) Never done    COVID-19 Vaccine (1) Never done    Varicella vaccine (1 of 2 - 2-dose childhood series) Never done    HIV screen  Never done    Flu vaccine (1) 08/01/2023       
Health     Financial Resource Strain: Low Risk  (7/7/2023)    Overall Financial Resource Strain (CARDIA)     Difficulty of Paying Living Expenses: Not hard at all   Food Insecurity: Not on file (7/7/2023)   Transportation Needs: Unknown (7/7/2023)    PRAPARE - Transportation     Lack of Transportation (Medical): Not on file     Lack of Transportation (Non-Medical): No   Physical Activity: Not on file   Stress: Not on file   Social Connections: Not on file   Intimate Partner Violence: Not on file   Housing Stability: Unknown (7/7/2023)    Housing Stability Vital Sign     Unable to Pay for Housing in the Last Year: Not on file     Number of Places Lived in the Last Year: Not on file     Unstable Housing in the Last Year: No     Family History   Problem Relation Age of Onset    Cancer Maternal Grandmother     Cancer Paternal Grandmother     Diabetes Paternal Grandmother      Current Outpatient Medications   Medication Sig    LORazepam (ATIVAN) 0.5 MG tablet TAKE 1 TABLET BY MOUTH TWICE DAILY AS NEEDED FOR ANXIETY FOR UP TO 30 DAYS. MAX DAILY AMOUNT:1MG    tiZANidine (ZANAFLEX) 4 MG tablet Take 1 tablet by mouth 3 times daily as needed    venlafaxine (EFFEXOR XR) 75 MG extended release capsule Take 1 capsule by mouth daily    topiramate (TOPAMAX) 25 MG tablet Take 1 tablet by mouth 2 times daily    fluticasone (FLONASE) 50 MCG/ACT nasal spray 2 sprays by Nasal route daily    cetirizine (ZYRTEC) 10 MG tablet Take 1 tablet by mouth daily    butalbital-acetaminophen-caffeine (FIORICET, ESGIC) -40 MG per tablet Take 1 tablet by mouth every 6 hours as needed for Headaches    Benzocaine-Menthol (CEPACOL) 15-2.3 MG LOZG Take 1 lozenge by mouth every 2 hours as needed (Sore throat) (Patient not taking: Reported on 1/9/2024)     No current facility-administered medications for this visit.     Allergies   Allergen Reactions    Bee Venom Hives and Swelling    Aspirin Hives     Other reaction(s): Unknown (comments)  headaches

## 2024-04-09 ENCOUNTER — OFFICE VISIT (OUTPATIENT)
Facility: CLINIC | Age: 44
End: 2024-04-09
Payer: MEDICAID

## 2024-04-09 VITALS
OXYGEN SATURATION: 98 % | BODY MASS INDEX: 19.14 KG/M2 | RESPIRATION RATE: 16 BRPM | DIASTOLIC BLOOD PRESSURE: 70 MMHG | SYSTOLIC BLOOD PRESSURE: 118 MMHG | WEIGHT: 104 LBS | HEIGHT: 62 IN | TEMPERATURE: 98.6 F | HEART RATE: 83 BPM

## 2024-04-09 DIAGNOSIS — F32.A ANXIETY AND DEPRESSION: Primary | ICD-10-CM

## 2024-04-09 DIAGNOSIS — F41.9 ANXIETY AND DEPRESSION: Primary | ICD-10-CM

## 2024-04-09 DIAGNOSIS — Z72.0 TOBACCO ABUSE: ICD-10-CM

## 2024-04-09 DIAGNOSIS — R63.6 UNDERWEIGHT: ICD-10-CM

## 2024-04-09 PROCEDURE — 99213 OFFICE O/P EST LOW 20 MIN: CPT | Performed by: FAMILY MEDICINE

## 2024-04-09 RX ORDER — LORAZEPAM 0.5 MG/1
TABLET ORAL
Qty: 60 TABLET | Refills: 0 | Status: SHIPPED | OUTPATIENT
Start: 2024-04-09 | End: 2024-05-07

## 2024-04-09 RX ORDER — GABAPENTIN 300 MG/1
300 CAPSULE ORAL NIGHTLY
COMMUNITY
Start: 2024-03-27 | End: 2024-05-26

## 2024-04-09 RX ORDER — CETIRIZINE HYDROCHLORIDE 10 MG/1
10 TABLET ORAL DAILY
Qty: 90 TABLET | Refills: 1 | Status: SHIPPED | OUTPATIENT
Start: 2024-04-09

## 2024-04-16 NOTE — PROGRESS NOTES
Chief Complaint   Patient presents with    3 Month Follow-Up    Medication Refill         \"Have you been to the ER, urgent care clinic since your last visit?  Hospitalized since your last visit?\"    NO    “Have you seen or consulted any other health care providers outside of [facility] since your last visit?”    [facility]            Health Maintenance Due   Topic Date Due    Hepatitis B vaccine (1 of 3 - 3-dose series) Never done    COVID-19 Vaccine (1) Never done    Varicella vaccine (1 of 2 - 2-dose childhood series) Never done    HIV screen  Never done       
Determinants of Health     Financial Resource Strain: Low Risk  (7/7/2023)    Overall Financial Resource Strain (CARDIA)     Difficulty of Paying Living Expenses: Not hard at all   Food Insecurity: Not on file (7/7/2023)   Transportation Needs: Unknown (7/7/2023)    PRAPARE - Transportation     Lack of Transportation (Medical): Not on file     Lack of Transportation (Non-Medical): No   Physical Activity: Not on file   Stress: Not on file   Social Connections: Not on file   Intimate Partner Violence: Not on file   Housing Stability: Unknown (7/7/2023)    Housing Stability Vital Sign     Unable to Pay for Housing in the Last Year: Not on file     Number of Places Lived in the Last Year: Not on file     Unstable Housing in the Last Year: No     Family History   Problem Relation Age of Onset    Cancer Maternal Grandmother     Cancer Paternal Grandmother     Diabetes Paternal Grandmother      Current Outpatient Medications   Medication Sig    cetirizine (ZYRTEC) 10 MG tablet Take 1 tablet by mouth daily    gabapentin (NEURONTIN) 300 MG capsule Take 1 capsule by mouth nightly.    LORazepam (ATIVAN) 0.5 MG tablet TAKE 1 TABLET BY MOUTH TWICE DAILY AS NEEDED FOR ANXIETY FOR UP TO 30 DAYS. MAX DAILY AMOUNT:1MG    tiZANidine (ZANAFLEX) 4 MG tablet Take 1 tablet by mouth 3 times daily as needed    venlafaxine (EFFEXOR XR) 75 MG extended release capsule Take 1 capsule by mouth daily    topiramate (TOPAMAX) 25 MG tablet Take 1 tablet by mouth 2 times daily    fluticasone (FLONASE) 50 MCG/ACT nasal spray 2 sprays by Nasal route daily    butalbital-acetaminophen-caffeine (FIORICET, ESGIC) -40 MG per tablet Take 1 tablet by mouth every 6 hours as needed for Headaches    Benzocaine-Menthol (CEPACOL) 15-2.3 MG LOZG Take 1 lozenge by mouth every 2 hours as needed (Sore throat) (Patient not taking: Reported on 1/9/2024)     No current facility-administered medications for this visit.     Allergies   Allergen Reactions    Bee

## 2024-07-09 ENCOUNTER — OFFICE VISIT (OUTPATIENT)
Facility: CLINIC | Age: 44
End: 2024-07-09
Payer: MEDICAID

## 2024-07-09 VITALS
HEIGHT: 62 IN | BODY MASS INDEX: 19.14 KG/M2 | RESPIRATION RATE: 16 BRPM | HEART RATE: 71 BPM | WEIGHT: 104 LBS | OXYGEN SATURATION: 97 % | SYSTOLIC BLOOD PRESSURE: 99 MMHG | TEMPERATURE: 98.1 F | DIASTOLIC BLOOD PRESSURE: 63 MMHG

## 2024-07-09 DIAGNOSIS — F41.9 ANXIETY AND DEPRESSION: ICD-10-CM

## 2024-07-09 DIAGNOSIS — F32.A ANXIETY AND DEPRESSION: ICD-10-CM

## 2024-07-09 PROCEDURE — 99213 OFFICE O/P EST LOW 20 MIN: CPT | Performed by: FAMILY MEDICINE

## 2024-07-09 RX ORDER — VENLAFAXINE HYDROCHLORIDE 75 MG/1
75 CAPSULE, EXTENDED RELEASE ORAL DAILY
Qty: 90 CAPSULE | Refills: 1 | Status: SHIPPED | OUTPATIENT
Start: 2024-07-09

## 2024-07-09 RX ORDER — LORAZEPAM 0.5 MG/1
TABLET ORAL
Qty: 60 TABLET | Refills: 0 | Status: SHIPPED | OUTPATIENT
Start: 2024-07-09 | End: 2024-08-06

## 2024-07-09 RX ORDER — TOPIRAMATE 25 MG/1
25 TABLET ORAL 2 TIMES DAILY
Qty: 60 TABLET | Refills: 2 | Status: SHIPPED | OUTPATIENT
Start: 2024-07-09

## 2024-07-09 SDOH — ECONOMIC STABILITY: FOOD INSECURITY: WITHIN THE PAST 12 MONTHS, YOU WORRIED THAT YOUR FOOD WOULD RUN OUT BEFORE YOU GOT MONEY TO BUY MORE.: NEVER TRUE

## 2024-07-09 SDOH — ECONOMIC STABILITY: FOOD INSECURITY: WITHIN THE PAST 12 MONTHS, THE FOOD YOU BOUGHT JUST DIDN'T LAST AND YOU DIDN'T HAVE MONEY TO GET MORE.: NEVER TRUE

## 2024-07-09 SDOH — ECONOMIC STABILITY: INCOME INSECURITY: HOW HARD IS IT FOR YOU TO PAY FOR THE VERY BASICS LIKE FOOD, HOUSING, MEDICAL CARE, AND HEATING?: NOT HARD AT ALL

## 2024-07-09 NOTE — PROGRESS NOTES
No chief complaint on file.       \"Have you been to the ER, urgent care clinic since your last visit?  Hospitalized since your last visit?\"    NO    “Have you seen or consulted any other health care providers outside of [facility] since your last visit?”    NO    Have you had a mammogram?”   [facility] before 2020     No breast cancer screening on file             Click Here for Release of Records Request     Health Maintenance Due   Topic Date Due    Hepatitis B vaccine (1 of 3 - 3-dose series) Never done    COVID-19 Vaccine (1) Never done    Varicella vaccine (1 of 2 - 2-dose childhood series) Never done    Pneumococcal 0-64 years Vaccine (1 of 2 - PCV) Never done    HIV screen  Never done    DTaP/Tdap/Td vaccine (1 - Tdap) Never done    Breast cancer screen  Never done       
allergy and medication lists.      Future Appointments   Date Time Provider Department Center   10/9/2024 10:40 AM Prateek Davis MD [id number]           Follow-up and Dispositions    Return in about 3 months (around 10/9/2024).

## 2024-10-18 ENCOUNTER — OFFICE VISIT (OUTPATIENT)
Facility: CLINIC | Age: 44
End: 2024-10-18
Payer: MEDICAID

## 2024-10-18 VITALS
OXYGEN SATURATION: 98 % | TEMPERATURE: 98.9 F | BODY MASS INDEX: 18.66 KG/M2 | DIASTOLIC BLOOD PRESSURE: 61 MMHG | WEIGHT: 102 LBS | HEART RATE: 74 BPM | RESPIRATION RATE: 16 BRPM | SYSTOLIC BLOOD PRESSURE: 97 MMHG

## 2024-10-18 DIAGNOSIS — F32.A ANXIETY AND DEPRESSION: Primary | ICD-10-CM

## 2024-10-18 DIAGNOSIS — G60.0 CMT (CHARCOT-MARIE-TOOTH DISEASE): ICD-10-CM

## 2024-10-18 DIAGNOSIS — Z23 ENCOUNTER FOR IMMUNIZATION: ICD-10-CM

## 2024-10-18 DIAGNOSIS — F41.9 ANXIETY AND DEPRESSION: Primary | ICD-10-CM

## 2024-10-18 DIAGNOSIS — Z91.09 ENVIRONMENTAL ALLERGIES: ICD-10-CM

## 2024-10-18 PROCEDURE — 90661 CCIIV3 VAC ABX FR 0.5 ML IM: CPT | Performed by: FAMILY MEDICINE

## 2024-10-18 PROCEDURE — 90471 IMMUNIZATION ADMIN: CPT | Performed by: FAMILY MEDICINE

## 2024-10-18 PROCEDURE — 99214 OFFICE O/P EST MOD 30 MIN: CPT | Performed by: FAMILY MEDICINE

## 2024-10-18 RX ORDER — CETIRIZINE HYDROCHLORIDE 10 MG/1
10 TABLET ORAL DAILY
Qty: 90 TABLET | Refills: 1 | Status: SHIPPED | OUTPATIENT
Start: 2024-10-18

## 2024-10-18 RX ORDER — FLUTICASONE PROPIONATE 50 MCG
2 SPRAY, SUSPENSION (ML) NASAL DAILY
Qty: 16 G | Refills: 2 | Status: SHIPPED | OUTPATIENT
Start: 2024-10-18

## 2024-10-18 RX ORDER — DULOXETIN HYDROCHLORIDE 20 MG/1
20 CAPSULE, DELAYED RELEASE ORAL DAILY
Qty: 30 CAPSULE | Refills: 3 | Status: SHIPPED | OUTPATIENT
Start: 2024-10-18

## 2024-10-18 NOTE — PROGRESS NOTES
Chief Complaint   Patient presents with    3 Month Follow-Up     Referral for new neuro - cannot get in to see [facility] until 8/2025         \"Have you been to the ER, urgent care clinic since your last visit?  Hospitalized since your last visit?\"    NO    “Have you seen or consulted any other health care providers outside of [facility] since your last visit?”    NO    Have you had a mammogram?”   NO    No breast cancer screening on file             Click Here for Release of Records Request     Health Maintenance Due   Topic Date Due    Pneumococcal 0-64 years Vaccine (1 of 2 - PCV) Never done    Varicella vaccine (1 of 2 - 13+ 2-dose series) Never done    HIV screen  Never done    Hepatitis B vaccine (1 of 3 - 19+ 3-dose series) Never done    DTaP/Tdap/Td vaccine (1 - Tdap) Never done    Breast cancer screen  Never done    Flu vaccine (1) 08/01/2024    COVID-19 Vaccine (1 - 2023-24 season) Never done

## 2024-10-24 NOTE — PROGRESS NOTES
Progress Note    Patient: Wanda Edwards MRN: [id number]  SSN: [id number]    YOB: 1980  Age: 44 y.o.  Sex: female        Chief Complaint   Patient presents with    3 Month Follow-Up     Referral for new neuro - cannot get in to see [facility] until 8/2025      she is a 44 y.o. year old female who presents with family member for follow up of chronic health conditions. Patient with hx of depression, anxiety, CMT and tobacco dependence. She has social anxiety and is unable to leave their house alone. She is followed by neurology and pain management.     Encounter Diagnoses   Name Primary?    Encounter for immunization Yes     BP Readings from Last 3 Encounters:   10/18/24 97/61   07/09/24 99/63   04/09/24 118/70     Wt Readings from Last 3 Encounters:   10/18/24 46.3 kg (102 lb)   07/09/24 47.2 kg (104 lb)   04/09/24 47.2 kg (104 lb)     Body mass index is 18.66 kg/m².    Patient Active Problem List   Diagnosis    CMT (Charcot-Esther-Tooth disease)    Tobacco abuse    Abscess of breast, right    Anemia    Depression with anxiety    Abnormal Pap smear of cervix    Controlled substance agreement signed     Past Surgical History:   Procedure Laterality Date    ANTERIOR CRUCIATE LIGAMENT REPAIR Right     BREAST SURGERY      GYN      ORTHOPEDIC SURGERY Right 04/24/2019    major reconstruction of right foot     Social History     Socioeconomic History    Marital status:      Spouse name: Not on file    Number of children: Not on file    Years of education: Not on file    Highest education level: Not on file   Occupational History    Not on file   Tobacco Use    Smoking status: Every Day     Current packs/day: 1.00     Average packs/day: 1 pack/day for 26.8 years (26.8 ttl pk-yrs)     Types: Cigarettes     Start date: 1998     Passive exposure: Current    Smokeless tobacco: Never   Substance and Sexual Activity    Alcohol use: Yes     Comment: ocassional    Drug use: No    Sexual activity: Not on file

## 2024-11-27 ENCOUNTER — OFFICE VISIT (OUTPATIENT)
Facility: CLINIC | Age: 44
End: 2024-11-27

## 2024-11-27 VITALS
TEMPERATURE: 98.6 F | BODY MASS INDEX: 19.14 KG/M2 | SYSTOLIC BLOOD PRESSURE: 104 MMHG | HEART RATE: 86 BPM | OXYGEN SATURATION: 98 % | RESPIRATION RATE: 16 BRPM | WEIGHT: 104 LBS | DIASTOLIC BLOOD PRESSURE: 65 MMHG | HEIGHT: 62 IN

## 2024-11-27 DIAGNOSIS — J02.9 SORE THROAT: ICD-10-CM

## 2024-11-27 DIAGNOSIS — F32.A ANXIETY AND DEPRESSION: ICD-10-CM

## 2024-11-27 DIAGNOSIS — F41.9 ANXIETY AND DEPRESSION: ICD-10-CM

## 2024-11-27 DIAGNOSIS — Z91.09 ENVIRONMENTAL ALLERGIES: ICD-10-CM

## 2024-11-27 DIAGNOSIS — J98.8 CONGESTION OF UPPER AIRWAY: Primary | ICD-10-CM

## 2024-11-27 DIAGNOSIS — Z72.0 TOBACCO ABUSE: ICD-10-CM

## 2024-11-27 RX ORDER — AZITHROMYCIN 250 MG/1
TABLET, FILM COATED ORAL
Qty: 6 TABLET | Refills: 0 | Status: SHIPPED | OUTPATIENT
Start: 2024-11-27 | End: 2024-12-07

## 2024-11-27 RX ORDER — PREDNISONE 10 MG/1
10 TABLET ORAL 2 TIMES DAILY
Qty: 10 TABLET | Refills: 0 | Status: SHIPPED | OUTPATIENT
Start: 2024-11-27 | End: 2024-12-02

## 2024-11-27 RX ORDER — ACETAMINOPHEN 500 MG
1000 TABLET ORAL EVERY 6 HOURS PRN
COMMUNITY
Start: 2024-11-25 | End: 2024-12-05

## 2024-11-27 RX ORDER — BENZONATATE 100 MG/1
100 CAPSULE ORAL 3 TIMES DAILY PRN
COMMUNITY
Start: 2024-11-25 | End: 2024-12-25

## 2024-11-27 NOTE — PROGRESS NOTES
Chief Complaint   Patient presents with    Cough     Sore throat, body aches - started Saturday     Congestion     Nasal congestion     Generalized Body Aches        \"Have you been to the ER, urgent care clinic since your last visit?  Hospitalized since your last visit?\"    Yes     “Have you seen or consulted any other health care providers outside of [facility] since your last visit?”    NO    Have you had a mammogram?”   NO    No breast cancer screening on file             Click Here for Release of Records Request     Health Maintenance Due   Topic Date Due    Pneumococcal 0-64 years Vaccine (1 of 2 - PCV) Never done    Varicella vaccine (1 of 2 - 13+ 2-dose series) Never done    HIV screen  Never done    Hepatitis B vaccine (1 of 3 - 19+ 3-dose series) Never done    DTaP/Tdap/Td vaccine (1 - Tdap) Never done    Breast cancer screen  Never done    COVID-19 Vaccine (1 - 2023-24 season) Never done

## 2024-11-30 NOTE — PROGRESS NOTES
Progress Note    Patient: Wanda Edwards MRN: [id number]  SSN: [id number]    YOB: 1980  Age: 44 y.o.  Sex: female        Chief Complaint   Patient presents with    Cough     Sore throat, body aches - started Saturday. ED 11/25/24 [facility].     Congestion     Nasal congestion     Generalized Body Aches     she is a 44 y.o. year old female who presents with family member with c/o ST, body aches and congestion. She was seen in ED and diagnosed with viral URI. Patient with dx of depression, anxiety, CMT and tobacco dependence. She has social anxiety and is unable to leave their house alone. She is followed by neurology and pain management.     Encounter Diagnoses   Name Primary?    Congestion of upper airway Yes    Sore throat     Environmental allergies     Anxiety and depression     Tobacco abuse      BP Readings from Last 3 Encounters:   11/27/24 104/65   10/18/24 97/61   07/09/24 99/63     Wt Readings from Last 3 Encounters:   11/27/24 47.2 kg (104 lb)   10/18/24 46.3 kg (102 lb)   07/09/24 47.2 kg (104 lb)     Body mass index is 19.02 kg/m².    Patient Active Problem List   Diagnosis    CMT (Charcot-Esther-Tooth disease)    Tobacco abuse    Abscess of breast, right    Anemia    Depression with anxiety    Abnormal Pap smear of cervix    Controlled substance agreement signed     Past Surgical History:   Procedure Laterality Date    ANTERIOR CRUCIATE LIGAMENT REPAIR Right     BREAST SURGERY      GYN      ORTHOPEDIC SURGERY Right 04/24/2019    major reconstruction of right foot     Social History     Socioeconomic History    Marital status:      Spouse name: Not on file    Number of children: Not on file    Years of education: Not on file    Highest education level: Not on file   Occupational History    Not on file   Tobacco Use    Smoking status: Every Day     Current packs/day: 1.00     Average packs/day: 1 pack/day for 26.9 years (26.9 ttl pk-yrs)     Types: Cigarettes     Start date: 1998

## 2024-12-02 LAB — SARS-COV-2 RNA RESP QL NAA+PROBE: NOT DETECTED

## 2025-01-20 ENCOUNTER — OFFICE VISIT (OUTPATIENT)
Facility: CLINIC | Age: 45
End: 2025-01-20
Payer: MEDICAID

## 2025-01-20 VITALS
RESPIRATION RATE: 16 BRPM | HEART RATE: 72 BPM | TEMPERATURE: 98.6 F | WEIGHT: 106 LBS | BODY MASS INDEX: 19.39 KG/M2 | DIASTOLIC BLOOD PRESSURE: 66 MMHG | OXYGEN SATURATION: 98 % | SYSTOLIC BLOOD PRESSURE: 103 MMHG

## 2025-01-20 DIAGNOSIS — F41.9 ANXIETY AND DEPRESSION: Primary | ICD-10-CM

## 2025-01-20 DIAGNOSIS — F32.A ANXIETY AND DEPRESSION: Primary | ICD-10-CM

## 2025-01-20 DIAGNOSIS — G60.0 CMT (CHARCOT-MARIE-TOOTH DISEASE): ICD-10-CM

## 2025-01-20 PROCEDURE — 99214 OFFICE O/P EST MOD 30 MIN: CPT | Performed by: FAMILY MEDICINE

## 2025-01-20 RX ORDER — LORAZEPAM 0.5 MG/1
TABLET ORAL
Qty: 60 TABLET | Refills: 0 | Status: SHIPPED | OUTPATIENT
Start: 2025-01-20 | End: 2025-02-17

## 2025-01-20 RX ORDER — DULOXETIN HYDROCHLORIDE 30 MG/1
30 CAPSULE, DELAYED RELEASE ORAL DAILY
Qty: 90 CAPSULE | Refills: 1 | Status: SHIPPED | OUTPATIENT
Start: 2025-01-20

## 2025-01-20 RX ORDER — CYCLOBENZAPRINE HCL 10 MG
TABLET ORAL
Qty: 90 TABLET | Refills: 1 | Status: SHIPPED | OUTPATIENT
Start: 2025-01-20

## 2025-01-20 SDOH — ECONOMIC STABILITY: FOOD INSECURITY: WITHIN THE PAST 12 MONTHS, YOU WORRIED THAT YOUR FOOD WOULD RUN OUT BEFORE YOU GOT MONEY TO BUY MORE.: NEVER TRUE

## 2025-01-20 SDOH — ECONOMIC STABILITY: FOOD INSECURITY: WITHIN THE PAST 12 MONTHS, THE FOOD YOU BOUGHT JUST DIDN'T LAST AND YOU DIDN'T HAVE MONEY TO GET MORE.: NEVER TRUE

## 2025-01-20 ASSESSMENT — PATIENT HEALTH QUESTIONNAIRE - PHQ9
SUM OF ALL RESPONSES TO PHQ9 QUESTIONS 1 & 2: 4
6. FEELING BAD ABOUT YOURSELF - OR THAT YOU ARE A FAILURE OR HAVE LET YOURSELF OR YOUR FAMILY DOWN: NEARLY EVERY DAY
1. LITTLE INTEREST OR PLEASURE IN DOING THINGS: MORE THAN HALF THE DAYS
SUM OF ALL RESPONSES TO PHQ QUESTIONS 1-9: 13
8. MOVING OR SPEAKING SO SLOWLY THAT OTHER PEOPLE COULD HAVE NOTICED. OR THE OPPOSITE, BEING SO FIGETY OR RESTLESS THAT YOU HAVE BEEN MOVING AROUND A LOT MORE THAN USUAL: NOT AT ALL
SUM OF ALL RESPONSES TO PHQ QUESTIONS 1-9: 13
7. TROUBLE CONCENTRATING ON THINGS, SUCH AS READING THE NEWSPAPER OR WATCHING TELEVISION: MORE THAN HALF THE DAYS
5. POOR APPETITE OR OVEREATING: SEVERAL DAYS
SUM OF ALL RESPONSES TO PHQ QUESTIONS 1-9: 13
3. TROUBLE FALLING OR STAYING ASLEEP: NOT AT ALL
9. THOUGHTS THAT YOU WOULD BE BETTER OFF DEAD, OR OF HURTING YOURSELF: NOT AT ALL
SUM OF ALL RESPONSES TO PHQ QUESTIONS 1-9: 13
4. FEELING TIRED OR HAVING LITTLE ENERGY: NEARLY EVERY DAY
2. FEELING DOWN, DEPRESSED OR HOPELESS: MORE THAN HALF THE DAYS
10. IF YOU CHECKED OFF ANY PROBLEMS, HOW DIFFICULT HAVE THESE PROBLEMS MADE IT FOR YOU TO DO YOUR WORK, TAKE CARE OF THINGS AT HOME, OR GET ALONG WITH OTHER PEOPLE: NOT DIFFICULT AT ALL

## 2025-01-20 NOTE — PROGRESS NOTES
Chief Complaint   Patient presents with    Follow-up Chronic Condition        \"Have you been to the ER, urgent care clinic since your last visit?  Hospitalized since your last visit?\"    NO    “Have you seen or consulted any other health care providers outside of [facility] since your last visit?”    NO    Have you had a mammogram?”   NO    No breast cancer screening on file             Click Here for Release of Records Request     Health Maintenance Due   Topic Date Due    Pneumococcal 0-64 years Vaccine (1 of 2 - PCV) Never done    Varicella vaccine (1 of 2 - 13+ 2-dose series) Never done    HIV screen  Never done    Hepatitis B vaccine (1 of 3 - 19+ 3-dose series) Never done    DTaP/Tdap/Td vaccine (1 - Tdap) Never done    Breast cancer screen  Never done    COVID-19 Vaccine (1 - 2023-24 season) Never done    Depression Monitoring  01/09/2025

## 2025-04-21 ENCOUNTER — OFFICE VISIT (OUTPATIENT)
Facility: CLINIC | Age: 45
End: 2025-04-21
Payer: MEDICAID

## 2025-04-21 VITALS
HEART RATE: 77 BPM | BODY MASS INDEX: 19.88 KG/M2 | DIASTOLIC BLOOD PRESSURE: 60 MMHG | TEMPERATURE: 98 F | SYSTOLIC BLOOD PRESSURE: 97 MMHG | WEIGHT: 108 LBS | OXYGEN SATURATION: 99 % | HEIGHT: 62 IN | RESPIRATION RATE: 16 BRPM

## 2025-04-21 DIAGNOSIS — Z91.09 ENVIRONMENTAL ALLERGIES: ICD-10-CM

## 2025-04-21 DIAGNOSIS — N92.6 IRREGULAR MENSES: Primary | ICD-10-CM

## 2025-04-21 DIAGNOSIS — F32.A ANXIETY AND DEPRESSION: ICD-10-CM

## 2025-04-21 DIAGNOSIS — G60.0 CMT (CHARCOT-MARIE-TOOTH DISEASE): ICD-10-CM

## 2025-04-21 DIAGNOSIS — F41.9 ANXIETY AND DEPRESSION: ICD-10-CM

## 2025-04-21 PROCEDURE — 99214 OFFICE O/P EST MOD 30 MIN: CPT | Performed by: FAMILY MEDICINE

## 2025-04-21 RX ORDER — FLUTICASONE PROPIONATE 50 MCG
2 SPRAY, SUSPENSION (ML) NASAL DAILY
Qty: 16 G | Refills: 2 | Status: SHIPPED | OUTPATIENT
Start: 2025-04-21

## 2025-04-21 RX ORDER — MEDROXYPROGESTERONE ACETATE 10 MG
10 TABLET ORAL DAILY
Qty: 10 TABLET | Refills: 0 | Status: SHIPPED | OUTPATIENT
Start: 2025-04-21

## 2025-04-21 RX ORDER — CYCLOBENZAPRINE HCL 10 MG
TABLET ORAL
Qty: 90 TABLET | Refills: 1 | Status: SHIPPED | OUTPATIENT
Start: 2025-04-21

## 2025-04-21 NOTE — PROGRESS NOTES
Chief Complaint   Patient presents with    3 Month Follow-Up         \"Have you been to the ER, urgent care clinic since your last visit?  Hospitalized since your last visit?\"    YES- [facility] 3/26/25 Ruptured ovarian cyst    “Have you seen or consulted any other health care providers outside of [facility] since your last visit?”    NO    Have you had a mammogram?”   NO    No breast cancer screening on file             Click Here for Release of Records Request     Health Maintenance Due   Topic Date Due    Varicella vaccine (1 of 2 - 13+ 2-dose series) Never done    HIV screen  Never done    Hepatitis B vaccine (1 of 3 - 19+ 3-dose series) Never done    DTaP/Tdap/Td vaccine (1 - Tdap) Never done    Pneumococcal 0-49 years Vaccine (1 of 2 - PCV) Never done    Breast cancer screen  Never done    COVID-19 Vaccine (1 - 2024-25 season) Never done

## 2025-07-21 ENCOUNTER — OFFICE VISIT (OUTPATIENT)
Facility: CLINIC | Age: 45
End: 2025-07-21
Payer: MEDICAID

## 2025-07-21 VITALS
TEMPERATURE: 98.3 F | DIASTOLIC BLOOD PRESSURE: 63 MMHG | RESPIRATION RATE: 16 BRPM | SYSTOLIC BLOOD PRESSURE: 96 MMHG | HEART RATE: 91 BPM | BODY MASS INDEX: 18.66 KG/M2 | WEIGHT: 102 LBS | OXYGEN SATURATION: 98 %

## 2025-07-21 DIAGNOSIS — Z91.09 ENVIRONMENTAL ALLERGIES: ICD-10-CM

## 2025-07-21 DIAGNOSIS — J98.8 CONGESTION OF UPPER AIRWAY: ICD-10-CM

## 2025-07-21 DIAGNOSIS — F41.9 ANXIETY AND DEPRESSION: Primary | ICD-10-CM

## 2025-07-21 DIAGNOSIS — J02.9 SORE THROAT: ICD-10-CM

## 2025-07-21 DIAGNOSIS — Z72.0 TOBACCO ABUSE: ICD-10-CM

## 2025-07-21 DIAGNOSIS — F32.A ANXIETY AND DEPRESSION: Primary | ICD-10-CM

## 2025-07-21 PROCEDURE — 99214 OFFICE O/P EST MOD 30 MIN: CPT | Performed by: FAMILY MEDICINE

## 2025-07-21 RX ORDER — LORAZEPAM 0.5 MG/1
TABLET ORAL
Qty: 60 TABLET | Refills: 0 | Status: SHIPPED | OUTPATIENT
Start: 2025-07-21 | End: 2025-08-18

## 2025-07-21 RX ORDER — AZITHROMYCIN 250 MG/1
TABLET, FILM COATED ORAL
Qty: 6 TABLET | Refills: 0 | Status: SHIPPED | OUTPATIENT
Start: 2025-07-21 | End: 2025-07-31

## 2025-07-21 RX ORDER — PREDNISONE 10 MG/1
10 TABLET ORAL 2 TIMES DAILY
Qty: 10 TABLET | Refills: 0 | Status: SHIPPED | OUTPATIENT
Start: 2025-07-21 | End: 2025-07-26

## 2025-07-21 RX ORDER — DEXTROMETHORPHAN HYDROBROMIDE AND PROMETHAZINE HYDROCHLORIDE 15; 6.25 MG/5ML; MG/5ML
5 SYRUP ORAL 4 TIMES DAILY PRN
Qty: 240 ML | Refills: 0 | Status: SHIPPED | OUTPATIENT
Start: 2025-07-21

## 2025-07-21 NOTE — PROGRESS NOTES
Chief Complaint   Patient presents with    3 Month Follow-Up    Cough     Headache, cough, hoarseness x1 week         \"Have you been to the ER, urgent care clinic since your last visit?  Hospitalized since your last visit?\"    NO    “Have you seen or consulted any other health care providers outside of [facility] since your last visit?”    NO    Have you had a mammogram?”   NO    No breast cancer screening on file             Click Here for Release of Records Request     Health Maintenance Due   Topic Date Due    Varicella vaccine (1 of 2 - 13+ 2-dose series) Never done    HIV screen  Never done    Hepatitis B vaccine (1 of 3 - 19+ 3-dose series) Never done    DTaP/Tdap/Td vaccine (1 - Tdap) Never done    Pneumococcal 0-49 years Vaccine (1 of 2 - PCV) Never done    Breast cancer screen  Never done    COVID-19 Vaccine (1 - 2024-25 season) Never done

## 2025-07-30 ENCOUNTER — TELEPHONE (OUTPATIENT)
Facility: CLINIC | Age: 45
End: 2025-07-30

## 2025-07-30 DIAGNOSIS — J98.8 CONGESTION OF UPPER AIRWAY: ICD-10-CM

## 2025-07-30 RX ORDER — DEXTROMETHORPHAN HYDROBROMIDE AND PROMETHAZINE HYDROCHLORIDE 15; 6.25 MG/5ML; MG/5ML
5 SYRUP ORAL 4 TIMES DAILY PRN
Qty: 240 ML | Refills: 0 | Status: SHIPPED | OUTPATIENT
Start: 2025-08-02

## 2025-07-30 NOTE — TELEPHONE ENCOUNTER
promethazine-dextromethorphan (PROMETHAZINE-DM) 6.25-15 MG/5ML syrup   Pt states she was told by pcp if she still coughing to call in for a refill if needed. Pt will like med refilled, please send to [facility]. Please advise.